# Patient Record
Sex: MALE | Race: WHITE | NOT HISPANIC OR LATINO | URBAN - METROPOLITAN AREA
[De-identification: names, ages, dates, MRNs, and addresses within clinical notes are randomized per-mention and may not be internally consistent; named-entity substitution may affect disease eponyms.]

---

## 2024-03-25 ENCOUNTER — OFFICE VISIT (OUTPATIENT)
Dept: SPORTS MEDICINE | Facility: CLINIC | Age: 49
End: 2024-03-25
Payer: COMMERCIAL

## 2024-03-25 ENCOUNTER — HOSPITAL ENCOUNTER (OUTPATIENT)
Dept: RADIOLOGY | Facility: CLINIC | Age: 49
Discharge: HOME | End: 2024-03-25
Payer: COMMERCIAL

## 2024-03-25 VITALS
BODY MASS INDEX: 25.77 KG/M2 | HEART RATE: 80 BPM | HEIGHT: 70 IN | WEIGHT: 180 LBS | DIASTOLIC BLOOD PRESSURE: 80 MMHG | SYSTOLIC BLOOD PRESSURE: 114 MMHG

## 2024-03-25 DIAGNOSIS — M25.561 ACUTE PAIN OF RIGHT KNEE: ICD-10-CM

## 2024-03-25 DIAGNOSIS — Q66.6 VALGUS DEFORMITY OF BOTH FEET: ICD-10-CM

## 2024-03-25 DIAGNOSIS — M70.51 SEMIMEMBRANOSUS BURSITIS OF RIGHT KNEE: ICD-10-CM

## 2024-03-25 DIAGNOSIS — M17.11 PRIMARY OSTEOARTHRITIS OF RIGHT KNEE: ICD-10-CM

## 2024-03-25 DIAGNOSIS — S83.511A SPRAIN OF ANTERIOR CRUCIATE LIGAMENT OF RIGHT KNEE, INITIAL ENCOUNTER: ICD-10-CM

## 2024-03-25 DIAGNOSIS — M24.559 HIP FLEXOR TIGHTNESS, UNSPECIFIED LATERALITY: ICD-10-CM

## 2024-03-25 DIAGNOSIS — S86.111A GASTROCNEMIUS STRAIN, RIGHT, INITIAL ENCOUNTER: ICD-10-CM

## 2024-03-25 DIAGNOSIS — M67.863 TENDINOSIS OF RIGHT KNEE: ICD-10-CM

## 2024-03-25 DIAGNOSIS — M22.8X1 PATELLAR TRACKING DISORDER OF RIGHT KNEE: ICD-10-CM

## 2024-03-25 DIAGNOSIS — M62.9 HAMSTRING TIGHTNESS OF BOTH LOWER EXTREMITIES: ICD-10-CM

## 2024-03-25 DIAGNOSIS — S83.241A TEAR OF MEDIAL MENISCUS OF RIGHT KNEE, UNSPECIFIED TEAR TYPE, UNSPECIFIED WHETHER OLD OR CURRENT TEAR, INITIAL ENCOUNTER: ICD-10-CM

## 2024-03-25 DIAGNOSIS — M17.11 PRIMARY LOCALIZED OSTEOARTHRITIS OF RIGHT KNEE: ICD-10-CM

## 2024-03-25 DIAGNOSIS — M25.361 KNEE INSTABILITY, RIGHT: ICD-10-CM

## 2024-03-25 DIAGNOSIS — M25.561 ACUTE PAIN OF RIGHT KNEE: Primary | ICD-10-CM

## 2024-03-25 DIAGNOSIS — S86.109A TRAUMATIC INJURY OF POPLITEUS MUSCLE: ICD-10-CM

## 2024-03-25 DIAGNOSIS — M21.70 LEG LENGTH DISCREPANCY: ICD-10-CM

## 2024-03-25 DIAGNOSIS — M25.461 EFFUSION OF RIGHT KNEE: ICD-10-CM

## 2024-03-25 PROCEDURE — 1036F TOBACCO NON-USER: CPT | Performed by: FAMILY MEDICINE

## 2024-03-25 PROCEDURE — 73564 X-RAY EXAM KNEE 4 OR MORE: CPT | Mod: RT

## 2024-03-25 PROCEDURE — 97170 ATHLETIC TRN EVAL MOD CMPLX: CPT | Performed by: FAMILY MEDICINE

## 2024-03-25 PROCEDURE — 73564 X-RAY EXAM KNEE 4 OR MORE: CPT | Mod: RIGHT SIDE | Performed by: RADIOLOGY

## 2024-03-25 PROCEDURE — 99204 OFFICE O/P NEW MOD 45 MIN: CPT | Performed by: FAMILY MEDICINE

## 2024-03-25 PROCEDURE — 99214 OFFICE O/P EST MOD 30 MIN: CPT | Performed by: FAMILY MEDICINE

## 2024-03-25 RX ORDER — IBUPROFEN 800 MG/1
800 TABLET ORAL 3 TIMES DAILY
Qty: 90 TABLET | Refills: 0 | Status: SHIPPED | OUTPATIENT
Start: 2024-03-25 | End: 2024-04-24

## 2024-03-25 RX ORDER — SERTRALINE HYDROCHLORIDE 100 MG/1
300 TABLET, FILM COATED ORAL
COMMUNITY

## 2024-03-25 ASSESSMENT — PAIN DESCRIPTION - DESCRIPTORS: DESCRIPTORS: ACHING;SHARP;STABBING;THROBBING

## 2024-03-25 ASSESSMENT — PAIN SCALES - GENERAL
PAINLEVEL_OUTOF10: 3
PAINLEVEL: 3

## 2024-03-25 ASSESSMENT — LIFESTYLE VARIABLES
HOW OFTEN DURING THE LAST YEAR HAVE YOU BEEN UNABLE TO REMEMBER WHAT HAPPENED THE NIGHT BEFORE BECAUSE YOU HAD BEEN DRINKING: NEVER
HOW OFTEN DO YOU HAVE A DRINK CONTAINING ALCOHOL: NEVER
HAVE YOU OR SOMEONE ELSE BEEN INJURED AS A RESULT OF YOUR DRINKING: NO
HOW OFTEN DURING THE LAST YEAR HAVE YOU FOUND THAT YOU WERE NOT ABLE TO STOP DRINKING ONCE YOU HAD STARTED: NEVER
AUDIT-C TOTAL SCORE: 0
HOW MANY STANDARD DRINKS CONTAINING ALCOHOL DO YOU HAVE ON A TYPICAL DAY: PATIENT DOES NOT DRINK
HAS A RELATIVE, FRIEND, DOCTOR, OR ANOTHER HEALTH PROFESSIONAL EXPRESSED CONCERN ABOUT YOUR DRINKING OR SUGGESTED YOU CUT DOWN: NO
HOW OFTEN DURING THE LAST YEAR HAVE YOU HAD A FEELING OF GUILT OR REMORSE AFTER DRINKING: NEVER
HOW OFTEN DURING THE LAST YEAR HAVE YOU NEEDED AN ALCOHOLIC DRINK FIRST THING IN THE MORNING TO GET YOURSELF GOING AFTER A NIGHT OF HEAVY DRINKING: NEVER
HOW OFTEN DO YOU HAVE SIX OR MORE DRINKS ON ONE OCCASION: NEVER
SKIP TO QUESTIONS 9-10: 1
AUDIT TOTAL SCORE: 0
HOW OFTEN DURING THE LAST YEAR HAVE YOU FAILED TO DO WHAT WAS NORMALLY EXPECTED FROM YOU BECAUSE OF DRINKING: NEVER

## 2024-03-25 ASSESSMENT — PATIENT HEALTH QUESTIONNAIRE - PHQ9
2. FEELING DOWN, DEPRESSED OR HOPELESS: NOT AT ALL
SUM OF ALL RESPONSES TO PHQ9 QUESTIONS 1 AND 2: 0
1. LITTLE INTEREST OR PLEASURE IN DOING THINGS: NOT AT ALL

## 2024-03-25 ASSESSMENT — COLUMBIA-SUICIDE SEVERITY RATING SCALE - C-SSRS
1. IN THE PAST MONTH, HAVE YOU WISHED YOU WERE DEAD OR WISHED YOU COULD GO TO SLEEP AND NOT WAKE UP?: NO
2. HAVE YOU ACTUALLY HAD ANY THOUGHTS OF KILLING YOURSELF?: NO

## 2024-03-25 ASSESSMENT — ENCOUNTER SYMPTOMS
OCCASIONAL FEELINGS OF UNSTEADINESS: 0
DEPRESSION: 0
LOSS OF SENSATION IN FEET: 0

## 2024-03-25 ASSESSMENT — PAIN - FUNCTIONAL ASSESSMENT: PAIN_FUNCTIONAL_ASSESSMENT: 0-10

## 2024-03-25 NOTE — PROGRESS NOTES
Verbal consent of the patient and/or verbal parental consent for patients under the age of 18 have been obtained to conduct a physical examination at this office visit.    New patient  History Of Present Illness  03/25/24 Pipe Izaguirre is a 48 y.o. male who presents for an evaluation of their Right knee pain .  He states that he has had right knee pain x 1 month with no known injury; however, his pain got significantly worse after feeling a pop on Friday (3/22/24) while walking into work. He states that his right knee was extended and he felt the pop while walking. He did not slip or step in a pothole, etc. He denies any bruising proceeding the injury.   He has been using crutches for ambulation since last Friday due to pain and swelling. Positive c/o instability. He denies any locking/catching, numbness/tingling.   He states that he had previously injured his RIGHT knee approximately 15-16 years and was diagnosed with a small medial meniscus tear at that time. He treated the meniscus tear conservatively and did not have any issues since that time. As a teenager he consistently sprained his ankle. He has noticed throughout the years he has put more pressure on his RIGHT foot and walking on the balls of his feet. This is due to compensation.   He states that his right knee pain has improved slightly since Friday night, but he continues to have moderate swelling, instability, and pain with ambulation.   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   He is taking OTC Ibuprofen and Tylenol for pain management and has been applying ice to his right knee frequently.   Pain today is 3/10 and is achy, sharp, stabbing, throbbing in nature. His pain is exacerbated by walking, bending, stair climbing.     All previous Progress Notes and imaging results related to this patients chief complaint have been reviewed in preparation for this examination.    Past Medical  History  He has a past medical history of Depression.    Surgical History  He has no past surgical history on file.     Social History  He reports that he has never smoked. He has never been exposed to tobacco smoke. He has never used smokeless tobacco. He reports that he does not currently use alcohol. Drug use questions deferred to the physician.    Family History  No family history on file.     Allergies  Patient has no known allergies.    Review of Systems  CONSTITUTIONAL:   Negative for weight change, loss of appetite, fatigue, weakness, fever, chills, night sweats, headaches .           HEENT:   Negative for cold, cough, sore throat, sinus pain, swollen lymph nodes.           OPHTHALMOLOGY:   Negative for diminished vision, blurred vision, loss of vision, double vision.           ALLERGY:   Negative for runny nose, scratchy throat, sinus congestion, rash, facial pressure, nasal congestion, post-nasal drip.           CARDIOLOGY:   Negative for chest pain, palpitations, murmurs, irregular heart beat, shortness of breath, leg edema, dyspnea on exertion, fatigue, dizziness.           RESPIRATORY:   Negative for chest pain, shortness of breath, swelling of the legs, asthma/copd, chest congestion, pain with breathing .           GASTROENTEROLOGY:   Negative for nausea, vomitting, heartburn, constipation, diarrhea, blood in stool, change in bowel habits, black stool.           HEMATOLOGY/LYMPH:   Negative for fatigue, loss of appetitie, easy bruising, easy bleeding, anemia, abnormal bleeding, slow healing.           ENDOCRINOLOGY:   Negative for polyuria, polydipsia, polyphagia, fatigue, weight loss, weight gain, cold intolerance, heat intolerance, diabetes.           MUSCULOSKELETAL:   Positive  for Right Knee pain and instability         DERMATOLOGY:   Negative for rash, bruising.           NEUROLOGY:   Negative for tingling, numbness, gait abnormality, paresthesias, weakness, sciatica.        General  Examination:  GENERAL APPEARANCE: Appears well, pleasant, and cooperative, NAD.   HEENT: Normal, unremarkable.   NECK: Supple.   HEART: RRR, normal S1S2.   LUNGS: Clear to auscultation bilaterally.   ABDOMEN: Soft, NT/ND, BS present.   EXTREMITIES: No cyanosis, clubbing.   SKIN: No rash or cellulitis.   NEUROLOGIC EXAM: Awake, A&O x 3, CN's II-XII grossly intact.   PSYCH: Good eye contact, appropriate mood and affect     The Scribe, Mariana, was present in the room during the entire visit including, but not limited to the physical examination!.    Examination:  RIGHT Posterior Knee  Edema: Positive  Diffusely.   Effusion: Negative.   Percussion Test: Negative  Tuning Fork Test:Negative  Ecchymosis/Bruising: Negative.            Palpation:   Positive  tenderness to palpation: RIGHT Knee posterior medial meniscus, medial head of gastrocnemius, popliteus, body of the medial mensicus and posterior horn of medial meniscus, distal hamstring, semimembrinosis and tendinosis    Orientation: Asymmetrical: because of the swelling.     Range of Motion:    Positive Knee Flexion ( 0-135 degrees) FROM, pain with end range flexion   Positive Knee Extension ( 0-15 degrees) Decreased because of pain and swelling           Muscle Strength:    Positive +4/+5 Quadricep Extension Causes pain  Positive +4/+5 Hamstring Flexion Causes pain  +5+5 Gastrocnemius  +5+5 Soleus.            Proprioception:   Pain with Squat: Positive    Pain with Sumo Squat:  Positive   Hop Test: Not assessed today  Single leg balance test Positive            Vascular:   +2/+4 Femoral  +2/+4 Dorsalis Pedis  +2/+4 Posterior Tibial  Capillary Refill less than 2 seconds.                Knee - ACL:  Anterior Drawer Test: Negative  Lachman Test: Negative  Lelli Test: Negative          KNEE - MCL / LCL:  Valgus Stress Test: 90 degrees: Negative  20-30 degrees: Negative  Varus Stress Test:  90 degrees: Negative, 20-30 degrees: Negative.   Apley Distraction Test: Positive   Medial.   Thessaly Test: Positive  Origin and Insertion MCL,  Posterior Medial Meniscus, Distal Hamstring.            KNEE - MENISCUS:  Apley Compression Test: Positive   Stienmann Test:Positive   Maren Test:  Positive Medial joint line.         Bounce Home Test:  Positive   Thessaly Test:  Positive Origin and Insertion MCL, Anterior Medial Meniscus, Posterior Medial Meniscus, Distal Hamstring.             KNEE - PATELLA:  Apprehension Test:  Positive   Glide Test:  Positive   Grind Test: Positive   Patella Tracking Test: Positive              KNEE - QUADRICEPS:         VMO Test: Positive   VLO Test:  Negative    Hip/Pelvis - Sacrum:  Leg Length Supine: Positive RIGHT leg shorter than the Left, during Physical Examination, and Will verify in the future when patient is able to fully bear weight   Leg Length Supine to Seated (Derbolowsky Sign): Positive RIGHT leg shorter than the Left, during Physical Examination, and Will verify in the future when patient is able to fully bear weight   Standing Flexion Test: Negative  Seated Flexion Test: Negative   Spring Test: Negative   Sacral Somatic Dysfunction: Negative   Hip Flexor Tightness: Positive RIGHT > Left  Hamstring Tightness: Positive Left> RIGHT     Examination:  RIGHTLower Extremity Gastrocnemius   Erythema: Negative.   Edema: Negative.   Effusion: Negative.   Warmth: Negative.   Ecchymosis/Bruising: Negative.   Percussion Test: Negative.   Tuning Fork Test: Negative.   Abrasions: Negative.   Orientation: Symmetrical.     ROM: FROM.            Muscle Strength:   +5/+5 Planar Flexion  +5/+5 Dorsi Flexion          +5/+5 Inversion  +5/+5 Eversion  +5/+5 Plantarflexion in combination with inversion  +5/+5 Plantarflexion in combination with eversion  +5/+5 Dorsiflexion in combination with inversion (Posterior Tibialis)  +5/+5 Dorsiflexion in combination with eversion (Peroneal Brevis)  +5/+5 Dorsiflexion in combination with eversion and flexion of great toe (Peroneal  Longus).            DTR/Neurological:   Sensation Intact, 2 Point Discrimination Test: Negative  +2/+4 Achilles Tendon Reflex (S1).     Sensation/Neurological:   Negative, Sensation Intact, 2 Point Discrimination Test: Negative  L3: Low back and front of upper leg/thigh  L4: Low back, front of upper leg/thigh, front of lower leg/calf, front of medial area of knee, and inside of ankle  L5: Low back, front and lateral knee, front and outside of lower leg/calf, top and bottom of foot, and first four toes especially big toe  S1: low back, back of upper leg/thigh, back and inside of lower leg, calf and little toe  S2: Buttocks, genitals, back of upper leg/thigh, and calves  S3: Buttocks and genitals.            Palpation: Positive: Tenderness to Palpation RIGHT lateral and medial gastrocnemius proximally, Popliteus           Vascular:   +2/+4 Dorsalis Pedis  +2/+4 Posterior Tibialis  Capillary Refill < 2 Seconds.               Leg/Ankle/Foot - Ankle Impingement:  Posterior Ankle Impingement Test: Negative.   Anterior Ankle Impingement Test: Negative.         Leg/Ankle/Foot - Ankle Instability:  Flexibility Test: Negative.   Anterior Drawer Test: Positive  slightly but end point felt   Talar Tilt Test: Negative.   External Rotation Kleiger Plantar Flexion Test: Negative.   External Rotation Kleiger Dorsiflexion Test: Negative.   Squeeze Test: Negative.   Dorsiflexion Test: Negative.   Posterior Tibial or Peroneal Tendon Instability Test: Negative.   Horizontal Squeeze Test: Negative.   Vertical Squeeze Test: Negative.         Leg/Ankle/Foot - DVT:  Saumya's Sign: Negative.         Leg/Ankle/Foot - Forefoot:  Strunsky Test:  Negative.   Gaenslen Maneuver Test:  Negative.   Metatarsal Tap Test:  Negative.   Crepitation Test:  Negative.         Leg/Ankle/Foot - Hindfoot Achilles/Calcaneus:  Butler Compression Test: Negative.   Hoffa Sign: Negative.   Achilles Tendon Tap Test: Negative.   Heel Compression Test: Negative.          Leg/Ankle/Foot - Nerve Irritation:  Haider Sign: Negative.   Digital Nerve Stretch Test: Negative.   Tinel Sign: Negative.   Tourniquet Sign: Negative.         Feet/Foot: Positive BILATERAL Valgus foot        Imaging and Diagnostics Review:    MRI RIGHT knee ordered today.   ---------------------------------------------   my personal over read shows tricompartmental DJD especially medial joint line and patellofemoral  XR RIGHT knee ordered today.   -------------------------------------------  Assessment   1. Acute pain of right knee  XR knee right 4+ views    Referral to Physical Therapy    MR knee right wo IV contrast    ibuprofen 800 mg tablet      2. Effusion of right knee  Referral to Physical Therapy    MR knee right wo IV contrast    ibuprofen 800 mg tablet      3. Knee instability, right  Referral to Physical Therapy    MR knee right wo IV contrast    ibuprofen 800 mg tablet      4. Primary localized osteoarthritis of right knee  Referral to Physical Therapy    MR knee right wo IV contrast    ibuprofen 800 mg tablet      5. Tear of medial meniscus of right knee, unspecified tear type, unspecified whether old or current tear, initial encounter  Referral to Physical Therapy    MR knee right wo IV contrast    ibuprofen 800 mg tablet      6. Gastrocnemius strain, right, initial encounter  Referral to Physical Therapy    MR knee right wo IV contrast    ibuprofen 800 mg tablet      7. Traumatic injury of popliteus muscle  Referral to Physical Therapy    MR knee right wo IV contrast    ibuprofen 800 mg tablet      8. Sprain of anterior cruciate ligament of right knee, initial encounter  Referral to Physical Therapy    MR knee right wo IV contrast    ibuprofen 800 mg tablet      9. Hamstring tightness of both lower extremities  Referral to Physical Therapy    MR knee right wo IV contrast    ibuprofen 800 mg tablet      10. Hip flexor tightness, unspecified laterality  Referral to Physical Therapy    MR knee right  wo IV contrast    ibuprofen 800 mg tablet      11. Tendinosis of right knee  Referral to Physical Therapy    MR knee right wo IV contrast    ibuprofen 800 mg tablet      12. Semimembranosus bursitis of right knee  Referral to Physical Therapy    MR knee right wo IV contrast    ibuprofen 800 mg tablet      13. Patellar tracking disorder of right knee  Referral to Physical Therapy    MR knee right wo IV contrast    ibuprofen 800 mg tablet      14. Leg length discrepancy  Referral to Physical Therapy    MR knee right wo IV contrast    ibuprofen 800 mg tablet      15. Valgus deformity of both feet  Referral to Physical Therapy    MR knee right wo IV contrast    ibuprofen 800 mg tablet      16. Primary osteoarthritis of right knee            Treatment or Intervention:  May continue to alternate ice and moist heat as needed  Reviewed handout degenerative joint disease of the knees, patellofemoral tracking syndrome, and  meniscus injury in detail with the patient at the time of this office visit.    Start into Physical Therapy 1-2 times a week for 8-10 weeks with manual therapy as well as dry needling and IASTM    Reviewed home exercises to be performed by the patient routinely   Stressed the importance of wearing shoes with good stability control to help with the biomechanics affecting the knees and lower extremities    Stressed the importance of wearing full foot insoles to help with the biomechanics affecting the knees and lower extremities    After MRI recommended to start over-the-counter calcium with vitamin-D 2 -3000+ milligrams a day, as well as OTC symphytum as directed daily to promote bony healing, in addition to a daily multivitamin.    After MRI recommended to start over-the-counter curcumin, turmeric, boswellia, as well as egg shell membrane as directed to aid with joint inflammation.   After MRI recommended to start over-the-counter Move Free for joint health.    The following prescription was sent to the  patient's pharmacy of record: Ibuprofen 800 milligrams, taking one every 8 hours with food as needed, Duration: 30 days, Dispense: #60, Refill: 1; the patient may alternate with OTC Tylenol Extra Strength or OTC Tylenol Arthritis, taking one every 6-8 hours with food as needed.    Patient advised regarding the risks and/or potential adverse reactions and/or side effects of any prescribed medications along with any over-the-counter medications or any supplements used. Patient advised to seek immediate medical care if any adverse reactions occur. The patient and/or patient(s) parent(s) verbalized their understanding   MRI of the RIGHT  knee to rule out ligament or tendon injury versus meniscal injury versus stress fracture versus other    Discussed in detail with the patient to the level of their understanding the possibility in the future of regenerative injections versus corticosteroid injections versus viscosupplementation injections versus a combination    When patient is able to equally weight bear, will order standing erect pelvis XR and  will provide appropriate __ millimeter heel lift to be placed in the RIGHT/LEFT shoe to accommodate for leg length discrepancy found on standing erect pelvis xray   Recommended to wear supportive shoes with insoles at work instead of wearing dress shoes.   Patient needs Play Maker Knee brace for the right knee. Staff will reach out to bracing representative, Forrest Egan, as correct size brace is not in stock in the office. Patient was given a  playmaker brace for their  right knee injury/condition. The patient is ambulatory with or without aid and feels more stable with the brace on. The brace definitely helps improve their function as well as stability. Verbal and written instructions for the use, wear schedule, cleaning, and application of this brace were given. Patient was instructed that should the brace result in increased pain, decreased sensation, numbness and/or  tingling, increased swelling, or an overall worsening of their medical condition; to please contact our office immediately. Orthotic/brace management and training was provided for skin care, modifications due to healing tissues, edema changes, interruption in skin integrity, and safety precautions with the Orthosis/brace.    Patient was educated on NWB vs PWB with crutches at this visit. He is using a pair of crutches that he had with him, and staff evaluated crutches for proper fit at this visit.   Follow-up after RIGHT  knee MRI or in 2-4 weeks for a reevaluation and possible xray or sooner as needed    Please note that this report has been produced using speech recognition software.  It may contain errors related to grammar, punctuation or spelling.  Electronically signed, but not reviewed.  FRED Park, Director of Sports Medicine    HARJINDER GONZALZE on 3/25/24 at 1:55 PM.     GASTON Park DO

## 2024-03-25 NOTE — PATIENT INSTRUCTIONS
May continue to alternate ice and moist heat as needed  Reviewed handout degenerative joint disease of the knees, patellofemoral tracking syndrome, and/or patellar tendinitis in detail with the patient to the level of their understanding; a copy of this handout was provided to the patient at the time of this office visit.    Start into Physical Therapy 1-2 times a week for 8-10 weeks with manual therapy as well as dry needling and IASTM    Reviewed home exercises to be performed by the patient routinely   Stressed the importance of wearing shoes with good stability control to help with the biomechanics affecting the knees and lower extremities    Stressed the importance of wearing full foot insoles to help with the biomechanics affecting the knees and lower extremities    After MRI recommended to start over-the-counter calcium with vitamin-D 2 -3000+ milligrams a day, as well as OTC symphytum as directed daily to promote bony healing, in addition to a daily multivitamin.    After MRI recommended to start over-the-counter curcumin, turmeric, boswellia, as well as egg shell membrane as directed to aid with joint inflammation.   After MRI recommended to start over-the-counter Move Free for joint health.    The following prescription was sent to the patient's pharmacy of record: Ibuprofen 800 milligrams, taking one every 8 hours with food as needed, Duration: 30 days, Dispense: #60, Refill: 1; the patient may alternate with OTC Tylenol Extra Strength or OTC Tylenol Arthritis, taking one every 6-8 hours with food as needed.    Patient advised regarding the risks and/or potential adverse reactions and/or side effects of any prescribed medications along with any over-the-counter medications or any supplements used. Patient advised to seek immediate medical care if any adverse reactions occur. The patient and/or patient(s) parent(s) verbalized their understanding   MRI of the RIGHT  knee to rule out ligament or tendon  injury versus meniscal injury versus stress fracture versus other    Discussed in detail with the patient to the level of their understanding the possibility in the future of regenerative injections versus corticosteroid injections versus viscosupplementation injections versus a combination    When patient is able to equally weight bear, will order standing erect pelvis XR and  will provide appropriate __ millimeter heel lift to be placed in the RIGHT/LEFT shoe to accommodate for leg length discrepancy found on standing erect pelvis xray   Recommended to wear supportive shoes with insoles at work instead of wearing dress shoes.   Patient needs Play Maker Knee brace for the right knee. Staff will reach out to bracing representative, Forrest Egan, as correct size brace is not in stock in the office. Patient was educated on NWB vs PWB with crutches at this visit. He is using a pair of crutches that he had with him, and staff evaluated crutches for proper fit at this visit.   Follow-up after RIGHT  knee MRI or in 2-4 weeks for a reevaluation and possible xray or sooner as needed

## 2024-04-04 NOTE — PROGRESS NOTES
Verbal consent of the patient and/or verbal parental consent for patients under the age of 18 have been obtained to conduct a physical examination at this office visit.    Established patient  History Of Present Illness  04/11/24 Pipe Izaguirre is a 48 y.o. male who presents for reevaluation of his RIGHT knee and to review his MRI results. He is accompanied to this visit by his wife. He states that while he continues to have pain in his RIGHT knee, it has improved overall since his last visit. However, he does feel like he has reached a bit of a plateau.  however he has not had a chance to start into physical therapy yet because of scheduling issues with physical therapy being too backed up.  He was able to wean himself off of his crutches after 3-4 days, and continues to take Ibuprofen 800 mg for pain management  and is using ice as needed.   He denies any instability, locking or catching in his RIGHT knee. He will get increased swelling at the end of the work day.   He is scheduled to start physical therapy next week.   He rates pain at 4/10 and states that the majority of his RIGHT Knee pain is now anterior and is primarily achy in nature. He has increased pain with prolonged walking, climbing stairs, bending, and squatting.     He says additionally he is having pain going up and down steps especially coming down and also after he sitting for long periods of time and goes to stand up having pain as well.  He would like to try to avoid surgical intervention is much as possible.  We talked in detail about his MRI results and his treatment options going forward.    All previous Progress Notes and imaging results related to this patients chief complaint have been reviewed in preparation for this examination.    Past Medical History  He has a past medical history of Depression.    Surgical History  He has no past surgical history on file.     Social History  He reports that he has never smoked. He has never been  exposed to tobacco smoke. He has never used smokeless tobacco. He reports that he does not currently use alcohol. Drug use questions deferred to the physician.    Family History  No family history on file.     Allergies  Patient has no known allergies.    Historical Clinical Intake  03/25/24 Pipe Izaguirre is a 48 y.o. male who presents for an evaluation of their Right knee pain .  He states that he has had right knee pain x 1 month with no known injury; however, his pain got significantly worse after feeling a pop on Friday (3/22/24) while walking into work. He states that his right knee was extended and he felt the pop while walking. He did not slip or step in a pothole, etc. He denies any bruising proceeding the injury.   He has been using crutches for ambulation since last Friday due to pain and swelling. Positive c/o instability. He denies any locking/catching, numbness/tingling.   He states that he had previously injured his RIGHT knee approximately 15-16 years and was diagnosed with a small medial meniscus tear at that time. He treated the meniscus tear conservatively and did not have any issues since that time. As a teenager he consistently sprained his ankle. He has noticed throughout the years he has put more pressure on his RIGHT foot and walking on the balls of his feet. This is due to compensation.   He states that his right knee pain has improved slightly since Friday night, but he continues to have moderate swelling, instability, and pain with ambulation.   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   He is taking OTC Ibuprofen and Tylenol for pain management and has been applying ice to his right knee frequently.   Pain today is 3/10 and is achy, sharp, stabbing, throbbing in nature. His pain is exacerbated by walking, bending, stair climbing.     Review of Systems  CONSTITUTIONAL:   Negative for weight change, loss of appetite, fatigue,  weakness, fever, chills, night sweats, headaches .           HEENT:   Negative for cold, cough, sore throat, sinus pain, swollen lymph nodes.           OPHTHALMOLOGY:   Negative for diminished vision, blurred vision, loss of vision, double vision.           ALLERGY:   Negative for runny nose, scratchy throat, sinus congestion, rash, facial pressure, nasal congestion, post-nasal drip.           CARDIOLOGY:   Negative for chest pain, palpitations, murmurs, irregular heart beat, shortness of breath, leg edema, dyspnea on exertion, fatigue, dizziness.           RESPIRATORY:   Negative for chest pain, shortness of breath, swelling of the legs, asthma/copd, chest congestion, pain with breathing .           GASTROENTEROLOGY:   Negative for nausea, vomitting, heartburn, constipation, diarrhea, blood in stool, change in bowel habits, black stool.           HEMATOLOGY/LYMPH:   Negative for fatigue, loss of appetitie, easy bruising, easy bleeding, anemia, abnormal bleeding, slow healing.           ENDOCRINOLOGY:   Negative for polyuria, polydipsia, polyphagia, fatigue, weight loss, weight gain, cold intolerance, heat intolerance, diabetes.           MUSCULOSKELETAL:   Positive  for Right Knee pain and instability         DERMATOLOGY:   Negative for rash, bruising.           NEUROLOGY:   Negative for tingling, numbness, gait abnormality, paresthesias, weakness, sciatica.         General Examination:  GENERAL APPEARANCE: Appears well, pleasant, and cooperative, NAD.   HEENT: Normal, unremarkable.   NECK: Supple.   HEART: RRR, normal S1S2.   LUNGS: Clear to auscultation bilaterally.   ABDOMEN: Soft, NT/ND, BS present.   EXTREMITIES: No cyanosis, clubbing.   SKIN: No rash or cellulitis.   NEUROLOGIC EXAM: Awake, A&O x 3, CN's II-XII grossly intact.   PSYCH: Good eye contact, appropriate mood and affect      The , Brittni, was present in the room during the entire visit including, but not limited to the physical  examination!.     Examination: All findings slightly improved since last visit   RIGHT Posterior Knee  Edema: Positive  Diffusely.   Effusion: Positive    Especially suprapatellar.   Percussion Test: Negative  Tuning Fork Test:Negative  Ecchymosis/Bruising: Negative.            Palpation: All findings slightly improved since last visit   Positive   still some tenderness to palpation: RIGHT Knee body of the lateral meniscus<body and posterior horn of the medial meniscus; tender posteriorly over the region of Baker's Cyst     Orientation: Positive  Still Asymmetrical: because of the  minimal swelling. All findings slightly improved since last visit      Range of Motion:  All findings slightly improved since last visit   Positive Knee Flexion ( 0-135 degrees) Decreased RIGHT approximately 10 degree extension lag  Negative: Knee Extension ( 0-15 degrees)               Muscle Strength:  All findings slightly improved since last visit   Positive +4-+5/+5 Quadricep Extension Causes pain  Positive +4-+5/+5 Hamstring Flexion Causes pain  +5+5 Gastrocnemius  +5+5 Soleus.            Proprioception: All findings slightly improved since last visit   Pain with Squat: Positive    Pain with Sumo Squat:  Positive   Hop Test: Not assessed today  Single leg balance test Positive            Vascular:   +2/+4 Femoral  +2/+4 Dorsalis Pedis  +2/+4 Posterior Tibial  Capillary Refill less than 2 seconds.                Knee - ACL:  Anterior Drawer Test: Negative  Lachman Test: Negative  Lelli Test: Negative          KNEE - MCL / LCL:All findings slightly improved since last visit   Valgus Stress Test: 90 degrees: Negative  20-30 degrees: Negative  Varus Stress Test:  90 degrees: Negative, 20-30 degrees: Negative.   Apley Distraction Test: Negative    Thessaly Test: Positive  Origin and Insertion MCL,  Posterior Medial Meniscus, Distal Hamstring.            KNEE - MENISCUS:All findings slightly improved since last visit   Apley Compression  Test: Negative  Stienmann Test: Negative   Maren Test: Negative        Bounce Home Test: Negative  Thessaly Test:  Positive Origin and Insertion MCL, Anterior Medial Meniscus, Posterior Medial Meniscus, Distal Hamstring.             KNEE - PATELLA:All findings slightly improved since last visit   Apprehension Test:  Negative  Glide Test:  Positive   Grind Test: Positive   Patella Tracking Test: Positive              KNEE - QUADRICEPS:       All findings slightly improved since last visit   VMO Test: Positive   VLO Test:  Negative     Hip/Pelvis - Sacrum:  Leg Length Supine: Positive RIGHT leg shorter than the Left, during Physical Examination, and Will verify in the future when patient is able to fully bear weight   Leg Length Supine to Seated (Derbolowsky Sign): Positive RIGHT leg shorter than the Left, during Physical Examination, and Will verify in the future when patient is able to fully bear weight   Standing Flexion Test: Negative  Seated Flexion Test: Negative   Spring Test: Negative   Sacral Somatic Dysfunction: Negative   Hip Flexor Tightness: Positive RIGHT > Left  Hamstring Tightness: Positive Left> RIGHT      Examination:  RIGHT Lower Extremity Gastrocnemius   Erythema: Negative.   Edema: Negative.   Effusion: Negative.   Warmth: Negative.   Ecchymosis/Bruising: Negative.   Percussion Test: Negative.   Tuning Fork Test: Negative.   Abrasions: Negative.   Orientation: Symmetrical.      ROM: FROM             Muscle Strength:   +5/+5 Planar Flexion  +5/+5 Dorsi Flexion          +5/+5 Inversion  +5/+5 Eversion  +5/+5 Plantarflexion in combination with inversion  +5/+5 Plantarflexion in combination with eversion  +5/+5 Dorsiflexion in combination with inversion (Posterior Tibialis)  +5/+5 Dorsiflexion in combination with eversion (Peroneal Brevis)  +5/+5 Dorsiflexion in combination with eversion and flexion of great toe (Peroneal Longus).            DTR/Neurological:   Sensation Intact, 2 Point  Discrimination Test: Negative  +2/+4 Achilles Tendon Reflex (S1).      Sensation/Neurological:   Negative, Sensation Intact, 2 Point Discrimination Test: Negative  L3: Low back and front of upper leg/thigh  L4: Low back, front of upper leg/thigh, front of lower leg/calf, front of medial area of knee, and inside of ankle  L5: Low back, front and lateral knee, front and outside of lower leg/calf, top and bottom of foot, and first four toes especially big toe  S1: low back, back of upper leg/thigh, back and inside of lower leg, calf and little toe  S2: Buttocks, genitals, back of upper leg/thigh, and calves  S3: Buttocks and genitals.            Palpation: All findings slightly improved since last visit   Positive: Tenderness to Palpation RIGHT lateral and medial gastrocnemius proximally, Popliteus           Vascular:   +2/+4 Dorsalis Pedis  +2/+4 Posterior Tibialis  Capillary Refill < 2 Seconds.               Leg/Ankle/Foot - Ankle Impingement:  Posterior Ankle Impingement Test: Negative.   Anterior Ankle Impingement Test: Negative.          Leg/Ankle/Foot - Ankle Instability:  Flexibility Test: Negative.   Anterior Drawer Test: Negative  Talar Tilt Test: Negative.   External Rotation Kleiger Plantar Flexion Test: Negative.   External Rotation Kleiger Dorsiflexion Test: Negative.   Squeeze Test: Negative.   Dorsiflexion Test: Negative.   Posterior Tibial or Peroneal Tendon Instability Test: Negative.   Horizontal Squeeze Test: Negative.   Vertical Squeeze Test: Negative.          Leg/Ankle/Foot - DVT:  Saumya's Sign: Negative.          Leg/Ankle/Foot - Forefoot:  Strunsky Test:  Negative.   Gaenslen Maneuver Test:  Negative.   Metatarsal Tap Test:  Negative.   Crepitation Test:  Negative.          Leg/Ankle/Foot - Hindfoot Achilles/Calcaneus:  Butler Compression Test: Negative.   Hoffa Sign: Negative.   Achilles Tendon Tap Test: Negative.   Heel Compression Test: Negative.          Leg/Ankle/Foot - Nerve  Irritation:  Haider Sign: Negative.   Digital Nerve Stretch Test: Negative.   Tinel Sign: Negative.   Tourniquet Sign: Negative.          Feet/Foot: Positive BILATERAL Valgus foot         Imaging and Diagnostics Review:    MR KNEE RIGHT WO IV CONTRAST; ;  4/8/2024 9:48 am      INDICATION:  Signs/Symptoms:RIGHT knee pain and instability, right knee effusion,  medial meniscus tear, ACL sprain, proximal gastrocnemius strain,  popliteus strain. Possibility of tendon rupture vs ligamentous tear  vs meniscus tear vs loose body. [facility] radiology to read please..      COMPARISON:  X-ray 03/25/2024      ACCESSION NUMBER(S):  [id number]      ORDERING CLINICIAN:  HARJINDER GONZALEZ      TECHNIQUE:  Multiplanar multisequence MRI obtained of the right knee      FINDINGS:  Anterior cruciate ligament demonstrates mild mucinous degeneration of  the ACL.      Posterior cruciate ligament is unremarkable      Lateral meniscus is unremarkable      Medial meniscus demonstrates at least partial root ligament avulsion  of the posterior horn of the medial meniscus. A portion of the  meniscus appears to remain intact to the root ligament insertion.  However, there is component of peripheral extrusion of the body of  the medial meniscus. No discrete meniscal gap about the root ligament  insertion. Remainder of the posterior horn of the medial meniscus  demonstrates intermediate signal intensity and moderate meniscal  degeneration about the mid posterior horn. However, there is a  extension to the articular surface within the posterior horn about  the body junction along the inferior articular surface with  longitudinal horizontal component of tear involving the posterior  horn about the posterior body and junction.      Medial collateral ligament complex is intact. There is mild edema  about the medial capsular structures      Lateral supporting structures are intact. There is mild popliteus  tendinosis. Mild fluid in the popliteus recess. Mild  edema tracking  along the popliteus muscle distal to the level of the joint line with  mild feathery appearing interstitial muscle edema demonstrated      Extensor mechanism is intact there is mild infrapatellar subcutaneous  edema. Patellofemoral articulation demonstrates severe thinning of  the articular cartilage within portions of the medial facet and apex  of the patella. There is osteophytosis along the outer margin of the  medial facet of the patella. Also, osteophytosis along the outer  margin of the medial trochlea with subchondral reactive edema. There  is severe thinning of the cartilage within the medial trochlea.      Lateral femorotibial compartment demonstrates mild chondromalacia. No  fissuring or cartilage defect. Medial femorotibial compartment  demonstrates severe loss of articular cartilage involving portions of  the central to outer margin weight-bearing portion of the compartment  with osteophytosis along the outer margin of the compartment.      There is a small knee joint effusion. No intra-articular bodies are  demonstrated. There is a small septated popliteal cyst.      IMPRESSION:  1. Least partial root ligament avulsion of the posterior horn of the  medial meniscus without meniscal gap with mild peripheral extrusion  of the body of the medial meniscus    2. Moderate meniscal degeneration of the posterior horn of the medial  meniscus with longitudinal horizontal tear along the inferior  articular surface of the posterior horn about the posterior body and  junction.    3. Medial femorotibial osteoarthritis with grade 4 chondromalacia  involving portions of the central to outer margin of the  weight-bearing portion of the compartment    4. Small knee joint effusion demonstrated    5. Mild popliteus muscle strain with mild edema tracking along the  muscle belly distally.          MACRO:  None      Signed by: Cesar Olson 4/8/2024 10:06 AM  Dictation workstation:    [id number]  -------------------------------------------------   XR KNEE RIGHT 4+ VIEWS; 3/25/2024 8:36 am      INDICATION:  Signs/Symptoms:right knee XR with standing and patella views. RIGHT  knee pain      COMPARISON:  None.      ACCESSION NUMBER(S):  [id number]      ORDERING CLINICIAN:  HARJINDER GONZALEZ      TECHNIQUE:  Number of films: Four view radiographs of the right knee.      FINDINGS:  No fractures or destructive lesions are identified. Degenerative  changes involve the medial and to a lesser degree the patellofemoral  joint compartment, with mild narrowing and small osteophytes. The  alignment is anatomic. The soft tissues are unremarkable. There is no  significant effusion in the suprapatellar bursa.      IMPRESSION:  Mild degenerative change without fracture or subluxation.      Signed by: Amandeep Radford 3/25/2024 2:40 PM  Dictation workstation:   [id number]      ---------------------------------------------   my personal over read shows tricompartmental DJD especially medial joint line and patellofemoral     Assessment   1. Traumatic injury of popliteus muscle        2. Acute pain of right knee        3. Effusion of right knee        4. Edema of knee        5. Knee instability, right        6. Primary localized osteoarthritis of right knee        7. Sprain of medial collateral ligament of right knee, sequela        8. Acute medial meniscus tear of right knee, subsequent encounter        9. Popliteus tendinitis of right lower extremity        10. Popliteal cyst, right        11. Semimembranosus bursitis of right knee        12. Chondromalacia of right patella        13. Patellar tracking disorder of right knee        14. Leg length discrepancy        15. Valgus deformity of both feet        16. Tendinosis of right knee        17. Hip flexor tightness, unspecified laterality        18. Hamstring tightness of both lower extremities        19. Sprain of anterior cruciate ligament of right knee,  subsequent encounter        20. Osteoarthritis of right knee, unspecified osteoarthritis type        21. Gastrocnemius strain, right, initial encounter            Treatment or Intervention:   May continue to alternate ice and moist heat as needed  Reviewed  degenerative joint disease of the knees, patellofemoral tracking syndrome, and  meniscus injury in detail with the patient  and his wife at the time of this office visit.    Stressed the importance of starting Physical Therapy 1-2 times a week for 8-10 weeks with manual therapy as well as dry needling and IASTM    Once again, reviewed home exercises to be performed by the patient routinely   Once again, stressed the importance of wearing shoes with good stability control to help with the biomechanics affecting the knees and lower extremities    Once again, stressed the importance of wearing full foot insoles to help with the biomechanics affecting the knees and lower extremities    Recommend to start over-the-counter  vitamin-D 2 -3000+ milligrams a day, as well as  a daily multivitamin.    Recommended to start over-the-counter curcumin, turmeric, boswellia, as well as egg shell membrane as directed to aid with joint inflammation.   Recommended to start over-the-counter Move Free for joint health.    Continue to take  Ibuprofen 800 milligrams, taking one every 8 hours with food as needed,   The patient may alternate  ibuprofen with OTC Tylenol Extra Strength or OTC Tylenol Arthritis, taking one every 6-8 hours with food as needed.    Patient advised regarding the risks and/or potential adverse reactions and/or side effects of any prescribed medications along with any over-the-counter medications or any supplements used. Patient advised to seek immediate medical care if any adverse reactions occur. The patient and/or patient(s) parent(s) verbalized their understanding   Reviewed MRI of the RIGHT  knee in detail with the patient to the level of their understanding.    Once again, discussed in detail with the patient to the level of their understanding the possibility in the future of regenerative injections versus corticosteroid injections versus viscosupplementation injections versus a combination    When patient is able to equally weight bear, will order standing erect pelvis XR and  will provide appropriate __ millimeter heel lift to be placed in the RIGHT/LEFT shoe to accommodate for leg length discrepancy found on standing erect pelvis xray   Once again, recommended to wear supportive shoes with insoles at work instead of wearing dress shoes.   Staff to initiate prior authorization process for viscosupplementation injections into the patients RIGHT/LEFT/BILATERAL knee under ultrasound. Staff will contact the patient to schedule injections once approval is received. and Patient to decide whether or not they will alternate with regenerative Prolozone injections as they are not covered by insurance; a price quote for these out-of-pocket expenses has been provided to the patient at the time of this office visit.    recommendation red light therapy might over read adapt series  Follow up once viscosupplementation injections are approved for the right knee  and potentially will be alternating with regenerative injections.   Please note that this report has been produced using speech recognition software.  It may contain errors related to grammar, punctuation or spelling.  Electronically signed, but not reviewed.  FRED Park, Director of Sports Medicine      HARJINDER GONZALEZ on 4/11/24 at 9:12 AM.     GASTON Park DO

## 2024-04-08 ENCOUNTER — HOSPITAL ENCOUNTER (OUTPATIENT)
Dept: RADIOLOGY | Facility: HOSPITAL | Age: 49
Discharge: HOME | End: 2024-04-08
Payer: COMMERCIAL

## 2024-04-08 DIAGNOSIS — S86.109A TRAUMATIC INJURY OF POPLITEUS MUSCLE: ICD-10-CM

## 2024-04-08 DIAGNOSIS — M67.863 TENDINOSIS OF RIGHT KNEE: ICD-10-CM

## 2024-04-08 DIAGNOSIS — S83.241A TEAR OF MEDIAL MENISCUS OF RIGHT KNEE, UNSPECIFIED TEAR TYPE, UNSPECIFIED WHETHER OLD OR CURRENT TEAR, INITIAL ENCOUNTER: ICD-10-CM

## 2024-04-08 DIAGNOSIS — M70.51 SEMIMEMBRANOSUS BURSITIS OF RIGHT KNEE: ICD-10-CM

## 2024-04-08 DIAGNOSIS — M25.461 EFFUSION OF RIGHT KNEE: ICD-10-CM

## 2024-04-08 DIAGNOSIS — M25.561 ACUTE PAIN OF RIGHT KNEE: ICD-10-CM

## 2024-04-08 DIAGNOSIS — M17.11 PRIMARY LOCALIZED OSTEOARTHRITIS OF RIGHT KNEE: ICD-10-CM

## 2024-04-08 DIAGNOSIS — M25.361 KNEE INSTABILITY, RIGHT: ICD-10-CM

## 2024-04-08 DIAGNOSIS — M24.559 HIP FLEXOR TIGHTNESS, UNSPECIFIED LATERALITY: ICD-10-CM

## 2024-04-08 DIAGNOSIS — Q66.6 VALGUS DEFORMITY OF BOTH FEET: ICD-10-CM

## 2024-04-08 DIAGNOSIS — M21.70 LEG LENGTH DISCREPANCY: ICD-10-CM

## 2024-04-08 DIAGNOSIS — S86.111A GASTROCNEMIUS STRAIN, RIGHT, INITIAL ENCOUNTER: ICD-10-CM

## 2024-04-08 DIAGNOSIS — M62.9 HAMSTRING TIGHTNESS OF BOTH LOWER EXTREMITIES: ICD-10-CM

## 2024-04-08 DIAGNOSIS — S83.511A SPRAIN OF ANTERIOR CRUCIATE LIGAMENT OF RIGHT KNEE, INITIAL ENCOUNTER: ICD-10-CM

## 2024-04-08 DIAGNOSIS — M22.8X1 PATELLAR TRACKING DISORDER OF RIGHT KNEE: ICD-10-CM

## 2024-04-08 PROCEDURE — 73721 MRI JNT OF LWR EXTRE W/O DYE: CPT | Mod: RT

## 2024-04-08 PROCEDURE — 73721 MRI JNT OF LWR EXTRE W/O DYE: CPT | Mod: RIGHT SIDE | Performed by: RADIOLOGY

## 2024-04-10 ENCOUNTER — TELEPHONE (OUTPATIENT)
Dept: SPORTS MEDICINE | Facility: CLINIC | Age: 49
End: 2024-04-10

## 2024-04-10 ENCOUNTER — OFFICE VISIT (OUTPATIENT)
Dept: SPORTS MEDICINE | Facility: CLINIC | Age: 49
End: 2024-04-10
Payer: COMMERCIAL

## 2024-04-10 VITALS
BODY MASS INDEX: 25.83 KG/M2 | WEIGHT: 180 LBS | HEART RATE: 73 BPM | DIASTOLIC BLOOD PRESSURE: 76 MMHG | SYSTOLIC BLOOD PRESSURE: 116 MMHG

## 2024-04-10 DIAGNOSIS — M25.469 EDEMA OF KNEE: ICD-10-CM

## 2024-04-10 DIAGNOSIS — M71.21 POPLITEAL CYST, RIGHT: ICD-10-CM

## 2024-04-10 DIAGNOSIS — M24.559 HIP FLEXOR TIGHTNESS, UNSPECIFIED LATERALITY: ICD-10-CM

## 2024-04-10 DIAGNOSIS — S83.511D SPRAIN OF ANTERIOR CRUCIATE LIGAMENT OF RIGHT KNEE, SUBSEQUENT ENCOUNTER: ICD-10-CM

## 2024-04-10 DIAGNOSIS — M22.8X1 PATELLAR TRACKING DISORDER OF RIGHT KNEE: ICD-10-CM

## 2024-04-10 DIAGNOSIS — M25.461 EFFUSION OF RIGHT KNEE: ICD-10-CM

## 2024-04-10 DIAGNOSIS — M22.41 CHONDROMALACIA OF RIGHT PATELLA: ICD-10-CM

## 2024-04-10 DIAGNOSIS — S86.109A TRAUMATIC INJURY OF POPLITEUS MUSCLE: Primary | ICD-10-CM

## 2024-04-10 DIAGNOSIS — M76.891 POPLITEUS TENDINITIS OF RIGHT LOWER EXTREMITY: ICD-10-CM

## 2024-04-10 DIAGNOSIS — M25.561 ACUTE PAIN OF RIGHT KNEE: ICD-10-CM

## 2024-04-10 DIAGNOSIS — Q66.6 VALGUS DEFORMITY OF BOTH FEET: ICD-10-CM

## 2024-04-10 DIAGNOSIS — S83.241D ACUTE MEDIAL MENISCUS TEAR OF RIGHT KNEE, SUBSEQUENT ENCOUNTER: ICD-10-CM

## 2024-04-10 DIAGNOSIS — M67.863 TENDINOSIS OF RIGHT KNEE: ICD-10-CM

## 2024-04-10 DIAGNOSIS — M25.361 KNEE INSTABILITY, RIGHT: ICD-10-CM

## 2024-04-10 DIAGNOSIS — S83.411S SPRAIN OF MEDIAL COLLATERAL LIGAMENT OF RIGHT KNEE, SEQUELA: ICD-10-CM

## 2024-04-10 DIAGNOSIS — M17.11 OSTEOARTHRITIS OF RIGHT KNEE, UNSPECIFIED OSTEOARTHRITIS TYPE: ICD-10-CM

## 2024-04-10 DIAGNOSIS — S86.111A GASTROCNEMIUS STRAIN, RIGHT, INITIAL ENCOUNTER: ICD-10-CM

## 2024-04-10 DIAGNOSIS — M62.9 HAMSTRING TIGHTNESS OF BOTH LOWER EXTREMITIES: ICD-10-CM

## 2024-04-10 DIAGNOSIS — M70.51 SEMIMEMBRANOSUS BURSITIS OF RIGHT KNEE: ICD-10-CM

## 2024-04-10 DIAGNOSIS — M17.11 PRIMARY LOCALIZED OSTEOARTHRITIS OF RIGHT KNEE: ICD-10-CM

## 2024-04-10 DIAGNOSIS — M21.70 LEG LENGTH DISCREPANCY: ICD-10-CM

## 2024-04-10 PROBLEM — S83.411A SPRAIN OF MEDIAL COLLATERAL LIGAMENT OF RIGHT KNEE: Status: ACTIVE | Noted: 2024-04-10

## 2024-04-10 PROCEDURE — 99214 OFFICE O/P EST MOD 30 MIN: CPT | Performed by: FAMILY MEDICINE

## 2024-04-10 ASSESSMENT — LIFESTYLE VARIABLES
SKIP TO QUESTIONS 9-10: 1
HOW OFTEN DURING THE LAST YEAR HAVE YOU NEEDED AN ALCOHOLIC DRINK FIRST THING IN THE MORNING TO GET YOURSELF GOING AFTER A NIGHT OF HEAVY DRINKING: NEVER
HOW OFTEN DURING THE LAST YEAR HAVE YOU HAD A FEELING OF GUILT OR REMORSE AFTER DRINKING: NEVER
HOW OFTEN DURING THE LAST YEAR HAVE YOU FAILED TO DO WHAT WAS NORMALLY EXPECTED FROM YOU BECAUSE OF DRINKING: NEVER
HOW OFTEN DO YOU HAVE SIX OR MORE DRINKS ON ONE OCCASION: NEVER
HOW OFTEN DURING THE LAST YEAR HAVE YOU FOUND THAT YOU WERE NOT ABLE TO STOP DRINKING ONCE YOU HAD STARTED: NEVER
HOW OFTEN DO YOU HAVE A DRINK CONTAINING ALCOHOL: NEVER
HOW OFTEN DURING THE LAST YEAR HAVE YOU BEEN UNABLE TO REMEMBER WHAT HAPPENED THE NIGHT BEFORE BECAUSE YOU HAD BEEN DRINKING: NEVER
HAVE YOU OR SOMEONE ELSE BEEN INJURED AS A RESULT OF YOUR DRINKING: NO
HOW MANY STANDARD DRINKS CONTAINING ALCOHOL DO YOU HAVE ON A TYPICAL DAY: PATIENT DOES NOT DRINK
AUDIT-C TOTAL SCORE: 0
AUDIT TOTAL SCORE: 0
HAS A RELATIVE, FRIEND, DOCTOR, OR ANOTHER HEALTH PROFESSIONAL EXPRESSED CONCERN ABOUT YOUR DRINKING OR SUGGESTED YOU CUT DOWN: NO

## 2024-04-10 ASSESSMENT — ENCOUNTER SYMPTOMS
OCCASIONAL FEELINGS OF UNSTEADINESS: 0
DEPRESSION: 0
LOSS OF SENSATION IN FEET: 0

## 2024-04-10 ASSESSMENT — PAIN SCALES - GENERAL
PAINLEVEL: 4
PAINLEVEL_OUTOF10: 4

## 2024-04-10 ASSESSMENT — PAIN DESCRIPTION - DESCRIPTORS: DESCRIPTORS: ACHING

## 2024-04-10 ASSESSMENT — COLUMBIA-SUICIDE SEVERITY RATING SCALE - C-SSRS
2. HAVE YOU ACTUALLY HAD ANY THOUGHTS OF KILLING YOURSELF?: NO
6. HAVE YOU EVER DONE ANYTHING, STARTED TO DO ANYTHING, OR PREPARED TO DO ANYTHING TO END YOUR LIFE?: NO
1. IN THE PAST MONTH, HAVE YOU WISHED YOU WERE DEAD OR WISHED YOU COULD GO TO SLEEP AND NOT WAKE UP?: NO

## 2024-04-10 ASSESSMENT — PAIN - FUNCTIONAL ASSESSMENT: PAIN_FUNCTIONAL_ASSESSMENT: 0-10

## 2024-04-10 NOTE — PATIENT INSTRUCTIONS
Treatment or Intervention:   May continue to alternate ice and moist heat as needed  Reviewed handout degenerative joint disease of the knees, patellofemoral tracking syndrome, and  meniscus injury in detail with the patient at the time of this office visit.    Stressed the importance of starting Physical Therapy 1-2 times a week for 8-10 weeks with manual therapy as well as dry needling and IASTM    Once again, reviewed home exercises to be performed by the patient routinely   Once again, stressed the importance of wearing shoes with good stability control to help with the biomechanics affecting the knees and lower extremities    Once again, stressed the importance of wearing full foot insoles to help with the biomechanics affecting the knees and lower extremities    Recommend to start over-the-counter calcium with vitamin-D 2 -3000+ milligrams a day, as well as OTC symphytum as directed daily to promote bony healing, in addition to a daily multivitamin.    Recommended to start over-the-counter curcumin, turmeric, boswellia, as well as egg shell membrane as directed to aid with joint inflammation.   Recommended to start over-the-counter Move Free for joint health.    Continue to take  Ibuprofen 800 milligrams, taking one every 8 hours with food as needed,   The patient may alternate with OTC Tylenol Extra Strength or OTC Tylenol Arthritis, taking one every 6-8 hours with food as needed.    Patient advised regarding the risks and/or potential adverse reactions and/or side effects of any prescribed medications along with any over-the-counter medications or any supplements used. Patient advised to seek immediate medical care if any adverse reactions occur. The patient and/or patient(s) parent(s) verbalized their understanding   Reviewed MRI of the RIGHT  knee in detail with the patient to the level of their understanding.   Once again, discussed in detail with the patient to the level of their understanding the  possibility in the future of regenerative injections versus corticosteroid injections versus viscosupplementation injections versus a combination    When patient is able to equally weight bear, will order standing erect pelvis XR and  will provide appropriate __ millimeter heel lift to be placed in the RIGHT/LEFT shoe to accommodate for leg length discrepancy found on standing erect pelvis xray   Once again, recommended to wear supportive shoes with insoles at work instead of wearing dress shoes.   Staff to initiate prior authorization process for viscosupplementation injections into the patients RIGHT/LEFT/BILATERAL knee under ultrasound. Staff will contact the patient to schedule injections once approval is received. and Patient to decide whether or not they will alternate with regenerative Prolozone injections as they are not covered by insurance; a price quote for these out-of-pocket expenses has been provided to the patient at the time of this office visit.   Recommendation to purchase through the office or online some type of red light therapy also known as Low Level Laser Therapy (LLLT), Biostimulation (BIOS), Photonic Stimulation or simply Light Box Therapy to be used a couple times a day to help activate regenerative healing     Follow up once viscosupplementation injections are approved for the right knee.

## 2024-04-11 NOTE — TELEPHONE ENCOUNTER
04/11/2024- Received Confidential FAX APPROVAL from [employer]. Patient was APPROVED for EUFLEXXA INJECTIONS into his RIGHT KNEE.     Auth Approval Number: [id number]  Dates: 04/10/2024-10/06/2024    Please call patient to schedule 1x a week for 3 weeks EUFLEXXA into his RIGHT KNEE.     Please Offer Cortisone Injection 1st if not alternating with Prolozone then start Euflexxa series 2 weeks later if patient schedules Cortisone.     APPROVAL SCANNED INTO CHART

## 2024-04-16 ENCOUNTER — EVALUATION (OUTPATIENT)
Dept: PHYSICAL THERAPY | Facility: CLINIC | Age: 49
End: 2024-04-16
Payer: COMMERCIAL

## 2024-04-16 DIAGNOSIS — M22.8X1 PATELLAR TRACKING DISORDER OF RIGHT KNEE: ICD-10-CM

## 2024-04-16 DIAGNOSIS — S86.111A GASTROCNEMIUS STRAIN, RIGHT, INITIAL ENCOUNTER: ICD-10-CM

## 2024-04-16 DIAGNOSIS — S83.511A SPRAIN OF ANTERIOR CRUCIATE LIGAMENT OF RIGHT KNEE, INITIAL ENCOUNTER: ICD-10-CM

## 2024-04-16 DIAGNOSIS — M24.559 HIP FLEXOR TIGHTNESS, UNSPECIFIED LATERALITY: ICD-10-CM

## 2024-04-16 DIAGNOSIS — M17.11 PRIMARY LOCALIZED OSTEOARTHRITIS OF RIGHT KNEE: ICD-10-CM

## 2024-04-16 DIAGNOSIS — Q66.6 VALGUS DEFORMITY OF BOTH FEET: ICD-10-CM

## 2024-04-16 DIAGNOSIS — S83.241A TEAR OF MEDIAL MENISCUS OF RIGHT KNEE, UNSPECIFIED TEAR TYPE, UNSPECIFIED WHETHER OLD OR CURRENT TEAR, INITIAL ENCOUNTER: ICD-10-CM

## 2024-04-16 DIAGNOSIS — S86.109A TRAUMATIC INJURY OF POPLITEUS MUSCLE: ICD-10-CM

## 2024-04-16 DIAGNOSIS — M21.70 LEG LENGTH DISCREPANCY: ICD-10-CM

## 2024-04-16 DIAGNOSIS — M67.863 TENDINOSIS OF RIGHT KNEE: ICD-10-CM

## 2024-04-16 DIAGNOSIS — M62.9 HAMSTRING TIGHTNESS OF BOTH LOWER EXTREMITIES: ICD-10-CM

## 2024-04-16 DIAGNOSIS — M25.561 ACUTE PAIN OF RIGHT KNEE: ICD-10-CM

## 2024-04-16 DIAGNOSIS — M25.461 EFFUSION OF RIGHT KNEE: ICD-10-CM

## 2024-04-16 DIAGNOSIS — M25.361 KNEE INSTABILITY, RIGHT: ICD-10-CM

## 2024-04-16 DIAGNOSIS — M70.51 SEMIMEMBRANOSUS BURSITIS OF RIGHT KNEE: ICD-10-CM

## 2024-04-16 PROCEDURE — 97110 THERAPEUTIC EXERCISES: CPT | Mod: GP

## 2024-04-16 PROCEDURE — 97161 PT EVAL LOW COMPLEX 20 MIN: CPT | Mod: GP

## 2024-04-16 ASSESSMENT — PAIN SCALES - GENERAL: PAINLEVEL_OUTOF10: 0 - NO PAIN

## 2024-04-16 ASSESSMENT — PAIN - FUNCTIONAL ASSESSMENT: PAIN_FUNCTIONAL_ASSESSMENT: 0-10

## 2024-04-23 ENCOUNTER — APPOINTMENT (OUTPATIENT)
Dept: PHYSICAL THERAPY | Facility: CLINIC | Age: 49
End: 2024-04-23
Payer: COMMERCIAL

## 2024-04-30 ENCOUNTER — APPOINTMENT (OUTPATIENT)
Dept: PHYSICAL THERAPY | Facility: CLINIC | Age: 49
End: 2024-04-30
Payer: COMMERCIAL

## 2024-05-07 ENCOUNTER — APPOINTMENT (OUTPATIENT)
Dept: PHYSICAL THERAPY | Facility: CLINIC | Age: 49
End: 2024-05-07
Payer: COMMERCIAL

## 2024-05-14 ENCOUNTER — APPOINTMENT (OUTPATIENT)
Dept: PHYSICAL THERAPY | Facility: CLINIC | Age: 49
End: 2024-05-14
Payer: COMMERCIAL

## 2024-05-21 ENCOUNTER — APPOINTMENT (OUTPATIENT)
Dept: PHYSICAL THERAPY | Facility: CLINIC | Age: 49
End: 2024-05-21
Payer: COMMERCIAL

## 2024-05-28 ENCOUNTER — APPOINTMENT (OUTPATIENT)
Dept: PHYSICAL THERAPY | Facility: CLINIC | Age: 49
End: 2024-05-28
Payer: COMMERCIAL

## 2024-06-04 ENCOUNTER — APPOINTMENT (OUTPATIENT)
Dept: PHYSICAL THERAPY | Facility: CLINIC | Age: 49
End: 2024-06-04
Payer: COMMERCIAL

## 2024-06-11 ENCOUNTER — APPOINTMENT (OUTPATIENT)
Dept: PHYSICAL THERAPY | Facility: CLINIC | Age: 49
End: 2024-06-11
Payer: COMMERCIAL

## 2024-06-26 ENCOUNTER — DOCUMENTATION (OUTPATIENT)
Dept: PHYSICAL THERAPY | Facility: CLINIC | Age: 49
End: 2024-06-26
Payer: COMMERCIAL

## 2024-06-26 NOTE — PROGRESS NOTES
Discharge Summary    Name: Pipe Izaguirre  MRN: [id number]  : 1975  Date: 24    Discharge Summary: PT    Discharge Information: Date of discharge 24 and Date of last visit 24    Therapy Summary: Pt not to therapy since above date. Pt canceled/did not attend any remaining sessions.    Rehab Discharge Reason: Failed to schedule and/or keep follow-up appointment(s)

## 2024-09-02 NOTE — TELEPHONE ENCOUNTER
04/10/2024- Initiated benefits investigation request via [id number] for Viscosupplementation Injections into the patients RIGHT KNEE.    RECEIVED AUTO APPROVAL for EUFLEXXA INJECTIONS INTO THE PATIENTS RIGHT KNEE.    Dates: 04/10/2024- 10/06/2024  Auth: [id number]    Waiting on FAX Approval.      Please review. Protocol Failed; No Protocol    Requested Prescriptions   Pending Prescriptions Disp Refills    METFORMIN  MG Oral Tablet 24 Hr [Pharmacy Med Name: Metformin Hydrochloride Er 24hr 500 Mg Tab Gran] 180 tablet 0     Sig: TAKE 2 TABLETS BY MOUTH ONCE DAILY WITH BREAKFAST       Diabetes Medication Protocol Failed - 8/30/2024 10:12 AM        Failed - Last A1C < 7.5 and within past 6 months     Lab Results   Component Value Date    A1C 7.1 (A) 10/17/2023             Passed - In person appointment or virtual visit in the past 6 mos or appointment in next 3 mos     Recent Outpatient Visits              1 month ago Medication monitoring encounter    [facility] Rafat French MD    Office Visit    2 months ago Viral URI    [facility], [facility], [facility] Rell Stallings Jr., APN    Office Visit    4 months ago Atopic dermatitis, unspecified type    [facility] Rafat French MD    Office Visit    4 months ago Vasculogenic erectile dysfunction, unspecified vasculogenic erectile dysfunction type    [facility] Libertad Vasquez MD    Office Visit    4 months ago Rash and other nonspecific skin eruption    [facility], [place] Rafat French MD    Office Visit          Future Appointments         Provider Department Appt Notes    In 4 weeks Rafat French MD [facility] Full body skin exam    In 1 month Aaron Gold MD [facility]     In 1 month Libertad Vasquez MD [facility] 6 mo f/u                    Passed - Microalbumin procedure in past 12 months or taking ACE/ARB        Passed - EGFRCR or GFRNAA > 50     GFR Evaluation  EGFRCR: 72 , resulted on 4/1/2024           Passed - GFR in the past 12 months               Future Appointments         Provider Department Appt Notes    In 4 weeks Rafat French MD [facility] Full body skin exam    In 1 month Aaron Gold MD [facility]     In 1 month Libertad Vasquez MD [facility] 6 mo f/u          Recent Outpatient Visits              1 month ago Medication monitoring encounter    [facility] Rafat French MD    Office Visit    2 months ago Viral URI    [facility], Walk[facility]In Clinic, [facility] Rell Stallings Jr., APN    Office Visit    4 months ago Atopic dermatitis, unspecified type    [facility] Rafat French MD    Office Visit    4 months ago Vasculogenic erectile dysfunction, unspecified vasculogenic erectile dysfunction type    [facility] Libertad Vasquez MD    Office Visit    4 months ago Rash and other nonspecific skin eruption    [facility], [address], [place] Rafat French MD    Office Visit

## 2025-06-30 ENCOUNTER — OFFICE VISIT (OUTPATIENT)
Dept: ORTHOPEDIC SURGERY | Facility: CLINIC | Age: 50
End: 2025-06-30
Payer: COMMERCIAL

## 2025-06-30 DIAGNOSIS — M54.12 CERVICAL RADICULOPATHY: Primary | ICD-10-CM

## 2025-06-30 PROCEDURE — 99203 OFFICE O/P NEW LOW 30 MIN: CPT | Performed by: ORTHOPAEDIC SURGERY

## 2025-06-30 PROCEDURE — 1036F TOBACCO NON-USER: CPT | Performed by: ORTHOPAEDIC SURGERY

## 2025-06-30 ASSESSMENT — ENCOUNTER SYMPTOMS
FATIGUE: 0
NECK PAIN: 1
ARTHRALGIAS: 1
FEVER: 0
CHILLS: 0
BRUISES/BLEEDS EASILY: 0
NUMBNESS: 1
NECK STIFFNESS: 1
SHORTNESS OF BREATH: 0
WHEEZING: 0

## 2025-06-30 ASSESSMENT — PAIN SCALES - GENERAL: PAINLEVEL_OUTOF10: 9

## 2025-06-30 ASSESSMENT — PAIN - FUNCTIONAL ASSESSMENT: PAIN_FUNCTIONAL_ASSESSMENT: 0-10

## 2025-06-30 NOTE — PROGRESS NOTES
Reason for Appointment  Chief Complaint   Patient presents with    Left Shoulder - Pain     Pinched nerve     History of Present Illness  New patient is a 49 y.o. male here today for evaluation of left shoulder pain. Today he states he has shoulder blade, trap, and medial scapula pain. He has radicular symptoms all the way down to his hands. This started 2 weeks ago. Reports stress knots in the upper back. He uses a massage gun with some relief. He got a massage on Friday and had aggravation. He has stiffness in the neck. Small finger and ring finger numbness. Report left elbow injury when he was a child. The pt reports no other recent falls or injuries. Past medical history allergies medications social and family history all reviewed.       Medical History[1]    Surgical History[2]    Medication Documentation Review Audit       Reviewed by Erin Schroeder MA (Medical Assistant) on 06/30/25 at 1324      Medication Order Taking? Sig Documenting Provider Last Dose Status   sertraline (Zoloft) 100 mg tablet [id number] Yes Take 3 tablets (300 mg) by mouth once daily in the morning. Take before meals. Historical Provider, MD Taking Active                    RX Allergies[3]    Review of Systems   Constitutional:  Negative for chills, fatigue and fever.   Respiratory:  Negative for shortness of breath and wheezing.    Cardiovascular:  Negative for chest pain and leg swelling.   Musculoskeletal:  Positive for arthralgias, neck pain and neck stiffness.   Allergic/Immunologic: Negative for immunocompromised state.   Neurological:  Positive for numbness.   Hematological:  Does not bruise/bleed easily.       Exam   Pt is alert awake, orientated to person place and time. No acute distress. Mood is good. Good pulses and good sensation. Negative Batista's sign. No auditory wheezes. No JVD.  No hyperreflexia. No skin changes. Good capillary refill. deformity of the left elbow and lacks extension. Good cuff strength. Good biceps  triceps strength. Stiffness in the neck.     Assessment   Cervical radiculopathy    Plan     The pt is having radicular symptoms consistent with a pinched nerve in the neck, we referred the pt to pain management for spinal intervention.       I, Mayra Mg, attest that this documentation has been prepared under the direction and in the presence of Tashi Rey MD.   By signing below, I, Tashi Rey MD, personally performed the services described in this documentation. All medical record entries made by the scribe were at my direction and in my presence. I have reviewed the chart and agree that the record reflects my personal performance and is accurate and complete.         [1]   Past Medical History:  Diagnosis Date    Depression    [2] History reviewed. No pertinent surgical history.  [3] No Known Allergies

## 2025-07-02 ENCOUNTER — HOSPITAL ENCOUNTER (OUTPATIENT)
Dept: RADIOLOGY | Facility: CLINIC | Age: 50
Discharge: HOME | End: 2025-07-02
Payer: COMMERCIAL

## 2025-07-02 ENCOUNTER — OFFICE VISIT (OUTPATIENT)
Dept: ORTHOPEDIC SURGERY | Facility: CLINIC | Age: 50
End: 2025-07-02
Payer: COMMERCIAL

## 2025-07-02 VITALS
DIASTOLIC BLOOD PRESSURE: 76 MMHG | HEART RATE: 82 BPM | WEIGHT: 180 LBS | BODY MASS INDEX: 25.77 KG/M2 | SYSTOLIC BLOOD PRESSURE: 116 MMHG | HEIGHT: 70 IN

## 2025-07-02 DIAGNOSIS — R29.898 LEFT ARM WEAKNESS: ICD-10-CM

## 2025-07-02 DIAGNOSIS — S46.812A TRAPEZIUS STRAIN, LEFT, INITIAL ENCOUNTER: ICD-10-CM

## 2025-07-02 DIAGNOSIS — M79.2 RADICULAR PAIN IN LEFT ARM: ICD-10-CM

## 2025-07-02 DIAGNOSIS — M54.14 THORACIC RADICULITIS: ICD-10-CM

## 2025-07-02 DIAGNOSIS — S46.812A STRAIN OF LEFT LEVATOR SCAPULAE MUSCLE, INITIAL ENCOUNTER: ICD-10-CM

## 2025-07-02 DIAGNOSIS — M54.2 CERVICALGIA: ICD-10-CM

## 2025-07-02 DIAGNOSIS — M50.30 BULGING OF CERVICAL INTERVERTEBRAL DISC: ICD-10-CM

## 2025-07-02 DIAGNOSIS — M51.34 DISCOGENIC THORACIC PAIN: ICD-10-CM

## 2025-07-02 DIAGNOSIS — S16.1XXA CERVICAL STRAIN, ACUTE, INITIAL ENCOUNTER: ICD-10-CM

## 2025-07-02 PROCEDURE — 99204 OFFICE O/P NEW MOD 45 MIN: CPT | Performed by: FAMILY MEDICINE

## 2025-07-02 PROCEDURE — 72070 X-RAY EXAM THORAC SPINE 2VWS: CPT | Performed by: RADIOLOGY

## 2025-07-02 PROCEDURE — 72050 X-RAY EXAM NECK SPINE 4/5VWS: CPT

## 2025-07-02 PROCEDURE — 72070 X-RAY EXAM THORAC SPINE 2VWS: CPT

## 2025-07-02 PROCEDURE — 99215 OFFICE O/P EST HI 40 MIN: CPT | Performed by: FAMILY MEDICINE

## 2025-07-02 PROCEDURE — 72050 X-RAY EXAM NECK SPINE 4/5VWS: CPT | Performed by: RADIOLOGY

## 2025-07-02 PROCEDURE — 1036F TOBACCO NON-USER: CPT | Performed by: FAMILY MEDICINE

## 2025-07-02 PROCEDURE — 3008F BODY MASS INDEX DOCD: CPT | Performed by: FAMILY MEDICINE

## 2025-07-02 RX ORDER — CYCLOBENZAPRINE HCL 10 MG
10 TABLET ORAL 3 TIMES DAILY PRN
Qty: 90 TABLET | Refills: 1 | Status: SHIPPED | OUTPATIENT
Start: 2025-07-02 | End: 2025-08-01

## 2025-07-02 RX ORDER — METHYLPREDNISOLONE ACETATE 40 MG/ML
40 INJECTION, SUSPENSION INTRA-ARTICULAR; INTRALESIONAL; INTRAMUSCULAR; SOFT TISSUE ONCE
Status: SHIPPED | OUTPATIENT
Start: 2025-07-02

## 2025-07-02 RX ORDER — METHYLPREDNISOLONE SODIUM SUCCINATE 125 MG/2ML
125 INJECTION INTRAMUSCULAR; INTRAVENOUS ONCE
Status: SHIPPED | OUTPATIENT
Start: 2025-07-02

## 2025-07-02 RX ORDER — KETOROLAC TROMETHAMINE 30 MG/ML
30 INJECTION, SOLUTION INTRAMUSCULAR; INTRAVENOUS ONCE
Status: SHIPPED | OUTPATIENT
Start: 2025-07-02 | End: 2025-07-07

## 2025-07-02 RX ORDER — PREDNISONE 20 MG/1
TABLET ORAL
Qty: 30 TABLET | Refills: 0 | Status: SHIPPED | OUTPATIENT
Start: 2025-07-02 | End: 2025-07-12

## 2025-07-02 RX ORDER — TRAMADOL HYDROCHLORIDE 50 MG/1
50 TABLET, FILM COATED ORAL EVERY 8 HOURS PRN
Qty: 15 TABLET | Refills: 0 | Status: SHIPPED | OUTPATIENT
Start: 2025-07-02 | End: 2025-07-07

## 2025-07-02 ASSESSMENT — PATIENT HEALTH QUESTIONNAIRE - PHQ9
SUM OF ALL RESPONSES TO PHQ9 QUESTIONS 1 AND 2: 0
1. LITTLE INTEREST OR PLEASURE IN DOING THINGS: NOT AT ALL
2. FEELING DOWN, DEPRESSED OR HOPELESS: NOT AT ALL

## 2025-07-02 ASSESSMENT — COLUMBIA-SUICIDE SEVERITY RATING SCALE - C-SSRS
2. HAVE YOU ACTUALLY HAD ANY THOUGHTS OF KILLING YOURSELF?: NO
1. IN THE PAST MONTH, HAVE YOU WISHED YOU WERE DEAD OR WISHED YOU COULD GO TO SLEEP AND NOT WAKE UP?: NO
6. HAVE YOU EVER DONE ANYTHING, STARTED TO DO ANYTHING, OR PREPARED TO DO ANYTHING TO END YOUR LIFE?: NO

## 2025-07-02 ASSESSMENT — ENCOUNTER SYMPTOMS
DEPRESSION: 0
OCCASIONAL FEELINGS OF UNSTEADINESS: 0
LOSS OF SENSATION IN FEET: 0

## 2025-07-02 ASSESSMENT — PAIN SCALES - GENERAL: PAINLEVEL_OUTOF10: 4

## 2025-07-02 NOTE — PATIENT INSTRUCTIONS
May continue to alternate ice and moist heat as needed  Reviewed herniated/bulging disc(s) in injury detail with the patient to the level of their understanding at the time of this office visit.   Start into physical therapy 1-2 times a week for 10-12 weeks with manual therapy, dry needling, IASTM, and traction   Reviewed home stretching exercises to be performed by the patient routinely  Stressed the importance of wearing shoes with good stability control to help with the biomechanics affecting the spine  Stressed the importance of wearing full foot insoles to help with the biomechanics affecting the spine and to provide cushioning  Recommendation over-the-counter calcium with vitamin-D 2 -3000+ milligrams a day, as well as OTC symphytum as directed daily to promote bony healing, in addition to a daily multivitamin.  Recommendation over-the-counter curcumin, turmeric, boswellia, as well as egg shell membrane as directed to aid with joint inflammation.  Recommendation over-the-counter Move Free for joint health.   May take OTC Tylenol Extra Strength or OTC Tylenol Arthritis, taking one every 6-8 hours with food as needed  The following prescription was sent to the patients pharmacy of record:  10 DAY TAPER:  The following prescription was electronically sent to the patient's pharmacy of record: Prednisone 20mg, take 4 tablets a day x 4 days, 3 tabletss a day x 3 days, 2 tablets a day x 2 days, and 1 tablet a day x 1 day with food; start tomorrow if received Solu-Medrol injection in the office at visit, otherwise start immediately, dispense quantity sufficient, with no refills   The following prescription was electronically sent to the patient's pharmacy of record: Flexeril (Cyclobenzaprine) 10 milligrams, may take 1 tablet 1-2 hours before bedtime as needed for muscle relaxation, Duration: 30 days, Dispense: #30, Refill: 0  TREATMENT PLAN: Patient received IM injection of SoluMedrol 125 milligrams at the time of  this office visit. , Patient received IM injection of Toradol 60 milligrams at the time of this office visit., and Patient received IM injection of DepoMedrol 40 milligrams at the time of this office visit.  Tramadol 50mg 1/2-1 pill every 8 hours only for severe pain PRN x 5 days, dispense 15, no refills   Patient advised regarding the risks and/or potential adverse reactions and/or side effects of any prescribed medications along with any over-the-counter medications or any supplements used. Patient advised to seek immediate medical care if any adverse reactions occur. The patient and/or patient(s) parent(s) verbalized their understanding  Discussed in detail with the patient to the level of their understanding the possibility in the future of a home cervical traction device  You have been ordered an MRI of the cervical and thoracic spine. Once you contact scheduling at [phone] and obtain the date and time of your MRI, contact our office at [phone] to schedule your follow-up appointment to review your results. Please note the results of your imaging will not be discussed over the telephone.  Follow-up after MRI

## 2025-07-02 NOTE — PROGRESS NOTES
Verbal consent of the patient and/or verbal parental consent for patients under the age of 18 have been obtained to conduct a physical examination at this office visit.    The  Jorge SÁNCHEZ was present in the room during the entire visit including, but not limited to the physical examination.    Established patient    History Of Present Illness  07/02/25 Pipe Izaguirre is a 49 y.o. 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  He states that he has been having 8/10 pain for the last 3 weeks. States that he began having pain in the left shoulder blade and then had a massage recently after and flared up even more. States that he is getting radicular pain into his left arm and hand with loss of strength. States that he was treated for pain at [facility] on Monday 6/30 where xrays were performed and emailed to Dr. Barrera.  We discussed his x-ray results that showed significant arthritic changes not only in his cervical spine but also his thoracic and lumbar spine.  Is concerned more right now is of the cervical spine and the radicular symptoms and weakness and numbness and tingling going into his left arm/hand.  He says recently his mother has passed away very quickly and he was dealing with lots of medical issues with that as well as a lot of stress and his trap flared up on his left side and he was unable to workout or do any activities for approximately 6 months and all the weight he lost and muscle he gained completely went the opposite way because he could not workout or do anything because 6 months and he was extremely stressed.  He went to get a massage done and afterwards everything happened that he developed the neck pain as well as the radicular symptoms.  We talked about stress and how it can affect the body as well as him having neuro psychological event due to stress affecting him neurologically.  I  additionally told him it seems like that he probably most likely has some herniated or bulging disks that have been aggravated that we should get some imaging done as well as starting him into physical therapy even though he has been going to chiropractic care for some time and getting therapy.  Tylenol and NSAIDs does not seem to help so we will start him on some oral steroids and potentially down the line to get him in for epidural injections if needed.    All previous Progress Notes and imaging results related to this patients chief complaint have been reviewed in preparation for this examination.    Past Medical History  He has a past medical history of Depression.    Surgical History  He has no past surgical history on file.     Social History  He reports that he has never smoked. He has never been exposed to tobacco smoke. He has never used smokeless tobacco. He reports that he does not currently use alcohol. Drug use questions deferred to the physician.    Family History  Family History[1]     Allergies  Patient has no known allergies.    Historical Clinical Intake    Review of Systems  CONSTITUTIONAL:   Negative for weight change, loss of appetite, fatigue, weakness, fever, chills, night sweats, headaches .           HEENT:   Negative for cold, cough, sore throat, sinus pain, swollen lymph nodes.           OPHTHALMOLOGY:   Negative for diminished vision, blurred vision, loss of vision, double vision.           ALLERGY:   Negative for runny nose, scratchy throat, sinus congestion, rash, facial pressure, nasal congestion, post-nasal drip.           CARDIOLOGY:   Negative for chest pain, palpitations, murmurs, irregular heart beat, shortness of breath, leg edema, dyspnea on exertion, fatigue, dizziness.           RESPIRATORY:   Negative for chest pain, shortness of breath, swelling of the legs, asthma/copd, chest congestion, pain with breathing .           GASTROENTEROLOGY:   Negative for nausea, vomitting,  heartburn, constipation, diarrhea, blood in stool, change in bowel habits, black stool.           HEMATOLOGY/LYMPH:   Negative for fatigue, loss of appetitie, easy bruising, easy bleeding, anemia, abnormal bleeding, slow healing.           ENDOCRINOLOGY:   Negative for polyuria, polydipsia, polyphagia, fatigue, weight loss, weight gain, cold intolerance, heat intolerance, diabetes.           MUSCULOSKELETAL:   Positive  for Left Shoudler and mid/upper back, and neck pain        DERMATOLOGY:   Negative for rash, bruising.           NEUROLOGY:   Negative for tingling, numbness, gait abnormality, paresthesias, weakness, sciatica.        Examination:  CERVICAL and THORACIC C2, C3, C4, C5, C6, C7, and T1  Erythema: Negative.   Edema: Negative.   Effusion: Negative.   TART Findings: Positive  Tissue Texture Changes, Asymmetry, Restriction, Tenderness lower cervical spine and upper thoracic on the left.   Warmth: Negative.   Ecchymosis/Bruising: Negative.   Percussion Test (CERVICAL): Negative.   Tuning Fork Test (CERVICAL): Negative.   Percussion Test (THORACIC):  Negative.   Tuning Fork Test (THORACIC):  Negative.   Abrasions: Negative.     Orientation (CERVICAL): Positive decreased cervical lordosis due to muscle spasms  Orientation (THORACIC): Positive decreased thoracic kyphosis due to muscle spasms    ROM (CERVICAL):  Positive FROM, Forward Flexion, Extension, and Lateral Bending (Side Bending) however pain noted especially with sidebending and rotation and when added and extension makes it worse.     ROM (THORACIC):   FROM, Flexion, Extension, Rotation, and Side Bending.     Muscle Strength:   Negative: Cervical Flexion, Extension, Left Rotation, Right Rotation, Left Lateral Flexion, Right Lateral Flexion, Trapezius (Shrug), Anterior Deltoid, Posterior Deltoid and Lateral Deltoid.          DTR/Neurological: Symmetrical  +2/+4 Biceps Reflex (C5)  +2/+4 Brachioradialis Reflex (C6)  +2/+4 Triceps Reflex (C7).      Sensation/Neurological Cervical:   Positive decreased left side  C2: Lower jaw and back of head  C3: Upper neck and back of head Positive decreases left side   C4: Lower neck, upper shoulders, and upper chest Positive decreases left side    C5: Area of collarbones, lateral upper arms, and upper chest, medial boarder of scapula: Positive decreases left side    C6: Lateral forearms, thumbs, anterior index finger, and lateral half of the middle finger Positive decreases left side    C7: Some of posterior index finger, medial half of middle finger, upper posterior back, and back of arms Positive decreases left side    C8: Ring finger, little finger, medial forearm, and posterior upper back. Positive decreases left side     Sensation/Neurological Thoracic:   Positive decreased left side  T1: Medial anterior upper arm, axilla, upper chest and posterior back Positive    T2: Upper chest and posterior back  T3: Upper chest and posterior back  T4: Upper chest (nipple area) and posterior back  T5: Mid chest and posterior back  T6: Mid chest and posterior back  T7: Mid chest and posterior back  T8: Upper abdomen and middle posterior back  T9: Upper abdomen and middle posterior back  T10: Abdomen (area of belly button) and middle posterior back  T11: Abdomen and middle posterior back  T12: Lower abdomen and middle posterior back.     Palpation:   Negative Tenderness to Palpation.     Vascular:   +2/+4,Carotid  +2/+4 Radial  Capillary Refill< 2 seconds.     Cervicle Spine Tests:  Lhermitte's Sign: Negative.   Spurling's Test (Atlanto-Axial Compression Test): Positive  .   Reverse Spurling's Test: Positive  .   Adson's Test: Positive    Harvey's Test: Positive    Corbin's Test: Negative.   Costoclavicular Test: Negative.   Cervical Spine Distraction Test: Positive    Tinel's Sign: Negative.   Brachial Plexus Tension Test: Negative.   Brachial Plexus Stretch Test: Negative.   Neutral Axial Compression Test: Negative.   Shoulder  ABduction (Bakody) Test: Negative.   Cervical Compression with Max Foraminal Compression Test: Negative.   Intervertebral Foramina Compression Test: Negative.   Flexion Compression Test: Positive  .   Extension Compression Test: Positive  .         Thoracic Spine Tests:  Vargas Forward Bend Test: Positive  .   Brudzinski's/Kernig's Test: Positive  .   Slump Test: Positive  .   First Thoracic Neve Root Test: Positive  .   Passive Scapular Approximation: Negative.   Scapular Elevation Test: Negative.   Scapular Retraction Test: Negative.   Scapular Protraction (Winging): Negative.   Costoclavicular Test: Negative.   Elevated Arms Stress Test (Suki Test): Negative.   Kyphosis Test on Hands and Knees: Negative.   Segmental Function in the Thoracic Spine in Extension Test: Positive      Chest Tests:  Sternum Compression Test: Negative.   Costosternal Pressure Test: Negative.   Rib Compression Test: Negative.   Schepelmann Test: Negative.   Pectoralis Major Contracture Test: Negative.   Chest Circumference Test: Negative.       ------------------------------------------------------    Imaging and Diagnostics Review:   Xrays images were obtained by [facility], showed severe degenerative changes throughout the cervical, thoracic, and lumbar spine new images ordered of the cervical, thoracic, and left shoulder.  Additionally MRI of the cervical and thoracic spine.    Assessment   1. Cervicalgia  methylPREDNISolone sodium succinate (PF) (SOLU-Medrol) injection 125 mg    methylPREDNISolone acetate (DEPO-Medrol) injection 40 mg    ketorolac (Toradol) injection 30 mg    predniSONE (Deltasone) 20 mg tablet    cyclobenzaprine (Flexeril) 10 mg tablet    Referral to Physical Therapy    MR cervical spine wo IV contrast    XR cervical spine complete 4-5 views    XR thoracic spine 2 views    MR thoracic spine wo IV contrast    traMADol (Ultram) 50 mg tablet      2. Cervical strain, acute, initial encounter  methylPREDNISolone  sodium succinate (PF) (SOLU-Medrol) injection 125 mg    methylPREDNISolone acetate (DEPO-Medrol) injection 40 mg    ketorolac (Toradol) injection 30 mg    predniSONE (Deltasone) 20 mg tablet    cyclobenzaprine (Flexeril) 10 mg tablet    Referral to Physical Therapy    MR cervical spine wo IV contrast    XR cervical spine complete 4-5 views    XR thoracic spine 2 views    MR thoracic spine wo IV contrast    traMADol (Ultram) 50 mg tablet      3. Strain of left levator scapulae muscle, initial encounter  methylPREDNISolone sodium succinate (PF) (SOLU-Medrol) injection 125 mg    methylPREDNISolone acetate (DEPO-Medrol) injection 40 mg    ketorolac (Toradol) injection 30 mg    predniSONE (Deltasone) 20 mg tablet    cyclobenzaprine (Flexeril) 10 mg tablet    Referral to Physical Therapy    MR cervical spine wo IV contrast    XR cervical spine complete 4-5 views    XR thoracic spine 2 views    MR thoracic spine wo IV contrast    traMADol (Ultram) 50 mg tablet      4. Trapezius strain, left, initial encounter  methylPREDNISolone sodium succinate (PF) (SOLU-Medrol) injection 125 mg    methylPREDNISolone acetate (DEPO-Medrol) injection 40 mg    ketorolac (Toradol) injection 30 mg    predniSONE (Deltasone) 20 mg tablet    cyclobenzaprine (Flexeril) 10 mg tablet    Referral to Physical Therapy    MR cervical spine wo IV contrast    XR cervical spine complete 4-5 views    XR thoracic spine 2 views    MR thoracic spine wo IV contrast    traMADol (Ultram) 50 mg tablet      5. Bulging of cervical intervertebral disc  methylPREDNISolone sodium succinate (PF) (SOLU-Medrol) injection 125 mg    methylPREDNISolone acetate (DEPO-Medrol) injection 40 mg    ketorolac (Toradol) injection 30 mg    predniSONE (Deltasone) 20 mg tablet    cyclobenzaprine (Flexeril) 10 mg tablet    Referral to Physical Therapy    MR cervical spine wo IV contrast    XR cervical spine complete 4-5 views    XR thoracic spine 2 views    MR thoracic spine wo IV  contrast    traMADol (Ultram) 50 mg tablet      6. Left arm weakness  methylPREDNISolone sodium succinate (PF) (SOLU-Medrol) injection 125 mg    methylPREDNISolone acetate (DEPO-Medrol) injection 40 mg    ketorolac (Toradol) injection 30 mg    predniSONE (Deltasone) 20 mg tablet    cyclobenzaprine (Flexeril) 10 mg tablet    Referral to Physical Therapy    MR cervical spine wo IV contrast    XR cervical spine complete 4-5 views    XR thoracic spine 2 views    MR thoracic spine wo IV contrast    traMADol (Ultram) 50 mg tablet      7. Radicular pain in left arm  methylPREDNISolone sodium succinate (PF) (SOLU-Medrol) injection 125 mg    methylPREDNISolone acetate (DEPO-Medrol) injection 40 mg    ketorolac (Toradol) injection 30 mg    predniSONE (Deltasone) 20 mg tablet    cyclobenzaprine (Flexeril) 10 mg tablet    Referral to Physical Therapy    MR cervical spine wo IV contrast    XR cervical spine complete 4-5 views    XR thoracic spine 2 views    MR thoracic spine wo IV contrast    traMADol (Ultram) 50 mg tablet      8. Discogenic thoracic pain  methylPREDNISolone sodium succinate (PF) (SOLU-Medrol) injection 125 mg    methylPREDNISolone acetate (DEPO-Medrol) injection 40 mg    ketorolac (Toradol) injection 30 mg    predniSONE (Deltasone) 20 mg tablet    cyclobenzaprine (Flexeril) 10 mg tablet    Referral to Physical Therapy    MR cervical spine wo IV contrast    XR cervical spine complete 4-5 views    XR thoracic spine 2 views    MR thoracic spine wo IV contrast    traMADol (Ultram) 50 mg tablet      9. Thoracic radiculitis  methylPREDNISolone sodium succinate (PF) (SOLU-Medrol) injection 125 mg    methylPREDNISolone acetate (DEPO-Medrol) injection 40 mg    ketorolac (Toradol) injection 30 mg    predniSONE (Deltasone) 20 mg tablet    cyclobenzaprine (Flexeril) 10 mg tablet    Referral to Physical Therapy    MR cervical spine wo IV contrast    XR cervical spine complete 4-5 views    XR thoracic spine 2 views    MR  thoracic spine wo IV contrast    traMADol (Ultram) 50 mg tablet          Treatment or Intervention:  May continue to alternate ice and moist heat as needed  Reviewed herniated/bulging disc(s) as well as degenerative changes in injury detail with the patient to the level of their understanding at the time of this office visit.   Start into physical therapy 1-2 times a week for 10-12 weeks with manual therapy, dry needling, IASTM, and cervical traction   Reviewed home stretching exercises to be performed by the patient routinely  Stressed the importance of wearing shoes with good stability control to help with the biomechanics affecting the spine  Stressed the importance of wearing full foot insoles to help with the biomechanics affecting the spine and to provide cushioning  Recommendation over-the-counter calcium with vitamin-D 2 -3000+ milligrams a day, as well as OTC symphytum as directed daily to promote bony healing, in addition to a daily multivitamin.  Recommendation to get back on over-the-counter curcumin, turmeric, boswellia, as well as egg shell membrane as directed to aid with joint inflammation.  Recommendation to get back on over-the-counter Move Free for joint health.   May take OTC Tylenol Extra Strength or OTC Tylenol Arthritis, taking one every 6-8 hours with food as needed in between the prednisone as needed for pain  The following prescription was sent to the patients pharmacy of record:  10 DAY TAPER:  The following prescription was electronically sent to the patient's pharmacy of record: Prednisone 20mg, take 4 tablets a day x 4 days, 3 tabletss a day x 3 days, 2 tablets a day x 2 days, and 1 tablet a day x 1 day with food; start tomorrow if received Solu-Medrol injection in the office at visit, otherwise start immediately, dispense quantity sufficient, with no refills   The following prescription was electronically sent to the patient's pharmacy of record: Flexeril (Cyclobenzaprine) 10  milligrams, may take 1 tablet 1-2 hours before bedtime as needed for muscle relaxation, Duration: 30 days, Dispense: #30, Refill: 0  TREATMENT PLAN: Patient received IM injection of SoluMedrol 125 milligrams at the time of this office visit. , Patient received IM injection of Toradol 60 milligrams at the time of this office visit., and Patient received IM injection of DepoMedrol 40 milligrams at the time of this office visit.  Tramadol 50mg 1/2-1 pill every 8 hours only for severe pain PRN x 5 days, dispense 15, no refills I or my designee accessed [place] today to monitor the patients controlled substance history. Reviewed, discussed, and educated patient and/or patients legal guardian about prescribed Opioid/Controlled Substance and had patients and/or patients legal guardian sign Opioid/Controlled Substance Prescription Agreement Consent Form. Additionally, I also discussed in detail with the patient and/or the patients legal guardian to the level of their understanding the risks of addiction and overdose associated with controlled substances. The patient and/or their legal guardian verbalized their understanding at the time of this office visit. Additionally, signed Opioid/Controlled Substance Prescription Staff Checklist Form which was performed by staff at the time of this office visit.   Patient advised regarding the risks and/or potential adverse reactions and/or side effects of any prescribed medications along with any over-the-counter medications or any supplements used. Patient advised to seek immediate medical care if any adverse reactions occur. The patient and/or patient(s) parent(s) verbalized their understanding  Discussed in detail with the patient to the level of their understanding the possibility in the future of a home cervical traction device  Possibly referral to physical medicine rehab or pain management for epidural injections  Possibility regenerative injections in the future  MRI of the  cervical and thoracic spine rule out herniated disc or stress fracture versus other  You have been ordered an MRI of the cervical and thoracic spine. Once you contact scheduling at [phone] and obtain the date and time of your MRI, contact our office at [phone] to schedule your follow-up appointment to review your results. Please note the results of your imaging will not be discussed over the telephone.  Follow-up after MRI cervical and thoracic spine      HARJINDER BARRERA on 7/2/25 at 4:25 PM.     Please note that this report has been produced using speech recognition software.  It may contain errors related to grammar, punctuation or spelling.  Electronically signed, but not reviewed.      Harjinder Barrera D.O. FAOASM         [1] No family history on file.

## 2025-07-09 ENCOUNTER — APPOINTMENT (OUTPATIENT)
Dept: ORTHOPEDIC SURGERY | Facility: CLINIC | Age: 50
End: 2025-07-09
Payer: COMMERCIAL

## 2025-07-14 ENCOUNTER — HOSPITAL ENCOUNTER (OUTPATIENT)
Dept: RADIOLOGY | Facility: CLINIC | Age: 50
Discharge: HOME | End: 2025-07-14
Payer: COMMERCIAL

## 2025-07-14 DIAGNOSIS — S46.812A TRAPEZIUS STRAIN, LEFT, INITIAL ENCOUNTER: ICD-10-CM

## 2025-07-14 DIAGNOSIS — M54.2 CERVICALGIA: ICD-10-CM

## 2025-07-14 DIAGNOSIS — M79.2 RADICULAR PAIN IN LEFT ARM: ICD-10-CM

## 2025-07-14 DIAGNOSIS — R29.898 LEFT ARM WEAKNESS: ICD-10-CM

## 2025-07-14 DIAGNOSIS — S16.1XXA CERVICAL STRAIN, ACUTE, INITIAL ENCOUNTER: ICD-10-CM

## 2025-07-14 DIAGNOSIS — S46.812A STRAIN OF LEFT LEVATOR SCAPULAE MUSCLE, INITIAL ENCOUNTER: ICD-10-CM

## 2025-07-14 DIAGNOSIS — M54.14 THORACIC RADICULITIS: ICD-10-CM

## 2025-07-14 DIAGNOSIS — M51.34 DISCOGENIC THORACIC PAIN: ICD-10-CM

## 2025-07-14 DIAGNOSIS — M50.30 BULGING OF CERVICAL INTERVERTEBRAL DISC: ICD-10-CM

## 2025-07-14 PROCEDURE — 72146 MRI CHEST SPINE W/O DYE: CPT | Performed by: RADIOLOGY

## 2025-07-14 PROCEDURE — 72141 MRI NECK SPINE W/O DYE: CPT

## 2025-07-14 PROCEDURE — 72141 MRI NECK SPINE W/O DYE: CPT | Performed by: RADIOLOGY

## 2025-07-14 PROCEDURE — 72146 MRI CHEST SPINE W/O DYE: CPT
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
## 2025-07-16 ENCOUNTER — OFFICE VISIT (OUTPATIENT)
Dept: ORTHOPEDIC SURGERY | Facility: CLINIC | Age: 50
End: 2025-07-16
Payer: COMMERCIAL

## 2025-07-16 ENCOUNTER — EVALUATION (OUTPATIENT)
Dept: PHYSICAL THERAPY | Facility: CLINIC | Age: 50
End: 2025-07-16
Payer: COMMERCIAL

## 2025-07-16 VITALS
BODY MASS INDEX: 25.77 KG/M2 | SYSTOLIC BLOOD PRESSURE: 143 MMHG | HEART RATE: 79 BPM | WEIGHT: 180 LBS | HEIGHT: 70 IN | DIASTOLIC BLOOD PRESSURE: 99 MMHG

## 2025-07-16 DIAGNOSIS — M47.22 CERVICAL RADICULOPATHY DUE TO DEGENERATIVE JOINT DISEASE OF SPINE: ICD-10-CM

## 2025-07-16 DIAGNOSIS — M48.00 CENTRAL STENOSIS OF SPINAL CANAL: ICD-10-CM

## 2025-07-16 DIAGNOSIS — M51.24 THORACIC DISC HERNIATION: ICD-10-CM

## 2025-07-16 DIAGNOSIS — M48.02 NEURAL FORAMINAL STENOSIS OF CERVICAL SPINE: ICD-10-CM

## 2025-07-16 DIAGNOSIS — M54.14 THORACIC RADICULITIS: ICD-10-CM

## 2025-07-16 DIAGNOSIS — M50.30 DEGENERATION OF CERVICAL INTERVERTEBRAL DISC: ICD-10-CM

## 2025-07-16 DIAGNOSIS — M47.24 THORACIC RADICULOPATHY DUE TO DEGENERATIVE JOINT DISEASE OF SPINE: Primary | ICD-10-CM

## 2025-07-16 DIAGNOSIS — M79.2 RADICULAR PAIN IN LEFT ARM: ICD-10-CM

## 2025-07-16 DIAGNOSIS — M50.30 BULGING OF CERVICAL INTERVERTEBRAL DISC: ICD-10-CM

## 2025-07-16 DIAGNOSIS — M25.78 DISC-OSTEOPHYTE COMPLEX: ICD-10-CM

## 2025-07-16 DIAGNOSIS — M54.2 CERVICALGIA: ICD-10-CM

## 2025-07-16 DIAGNOSIS — M50.30 DDD (DEGENERATIVE DISC DISEASE), CERVICAL: ICD-10-CM

## 2025-07-16 DIAGNOSIS — M51.34 DISCOGENIC THORACIC PAIN: ICD-10-CM

## 2025-07-16 DIAGNOSIS — M50.30 DISC-OSTEOPHYTE COMPLEX: ICD-10-CM

## 2025-07-16 DIAGNOSIS — M47.22 CERVICAL SPONDYLOSIS WITH RADICULOPATHY: Primary | ICD-10-CM

## 2025-07-16 DIAGNOSIS — S16.1XXA CERVICAL STRAIN, ACUTE, INITIAL ENCOUNTER: ICD-10-CM

## 2025-07-16 DIAGNOSIS — S46.812D STRAIN OF LEFT LEVATOR SCAPULAE MUSCLE, SUBSEQUENT ENCOUNTER: ICD-10-CM

## 2025-07-16 DIAGNOSIS — R29.898 LEFT ARM WEAKNESS: ICD-10-CM

## 2025-07-16 DIAGNOSIS — M48.02 CERVICAL SPINAL STENOSIS: ICD-10-CM

## 2025-07-16 PROCEDURE — 1036F TOBACCO NON-USER: CPT | Performed by: FAMILY MEDICINE

## 2025-07-16 PROCEDURE — 99214 OFFICE O/P EST MOD 30 MIN: CPT | Performed by: FAMILY MEDICINE

## 2025-07-16 PROCEDURE — 3008F BODY MASS INDEX DOCD: CPT | Performed by: FAMILY MEDICINE

## 2025-07-16 PROCEDURE — 99213 OFFICE O/P EST LOW 20 MIN: CPT | Performed by: FAMILY MEDICINE

## 2025-07-16 PROCEDURE — 2500000004 HC RX 250 GENERAL PHARMACY W/ HCPCS (ALT 636 FOR OP/ED): Mod: JW | Performed by: FAMILY MEDICINE

## 2025-07-16 PROCEDURE — 96372 THER/PROPH/DIAG INJ SC/IM: CPT | Performed by: FAMILY MEDICINE

## 2025-07-16 PROCEDURE — 97162 PT EVAL MOD COMPLEX 30 MIN: CPT | Mod: GP | Performed by: PHYSICAL THERAPIST

## 2025-07-16 RX ORDER — KETOROLAC TROMETHAMINE 30 MG/ML
30 INJECTION, SOLUTION INTRAMUSCULAR; INTRAVENOUS ONCE
Status: COMPLETED | OUTPATIENT
Start: 2025-07-16 | End: 2025-07-16

## 2025-07-16 RX ORDER — PREGABALIN 50 MG/1
50 CAPSULE ORAL 2 TIMES DAILY
Qty: 60 CAPSULE | Refills: 0 | Status: SHIPPED | OUTPATIENT
Start: 2025-07-16 | End: 2025-08-15

## 2025-07-16 RX ORDER — PREDNISONE 10 MG/1
TABLET ORAL
Qty: 30 TABLET | Refills: 0 | Status: SHIPPED | OUTPATIENT
Start: 2025-07-16

## 2025-07-16 RX ORDER — METHYLPREDNISOLONE SODIUM SUCCINATE 125 MG/2ML
125 INJECTION INTRAMUSCULAR; INTRAVENOUS ONCE
Status: COMPLETED | OUTPATIENT
Start: 2025-07-16 | End: 2025-07-16

## 2025-07-16 RX ORDER — METHYLPREDNISOLONE ACETATE 40 MG/ML
40 INJECTION, SUSPENSION INTRA-ARTICULAR; INTRALESIONAL; INTRAMUSCULAR; SOFT TISSUE ONCE
Status: COMPLETED | OUTPATIENT
Start: 2025-07-16 | End: 2025-07-16

## 2025-07-16 RX ORDER — CYCLOBENZAPRINE HCL 10 MG
10 TABLET ORAL NIGHTLY PRN
Qty: 20 TABLET | Refills: 0 | Status: SHIPPED | OUTPATIENT
Start: 2025-07-16 | End: 2025-08-05

## 2025-07-16 RX ADMIN — KETOROLAC TROMETHAMINE 30 MG: 60 INJECTION, SOLUTION INTRAMUSCULAR at 08:30

## 2025-07-16 RX ADMIN — METHYLPREDNISOLONE SODIUM SUCCINATE 125 MG: 125 INJECTION, POWDER, FOR SOLUTION INTRAMUSCULAR; INTRAVENOUS at 08:31

## 2025-07-16 RX ADMIN — METHYLPREDNISOLONE ACETATE 40 MG: 40 INJECTION, SUSPENSION INTRA-ARTICULAR; INTRALESIONAL; INTRAMUSCULAR; SOFT TISSUE at 08:32

## 2025-07-16 ASSESSMENT — COLUMBIA-SUICIDE SEVERITY RATING SCALE - C-SSRS
1. IN THE PAST MONTH, HAVE YOU WISHED YOU WERE DEAD OR WISHED YOU COULD GO TO SLEEP AND NOT WAKE UP?: NO
2. HAVE YOU ACTUALLY HAD ANY THOUGHTS OF KILLING YOURSELF?: NO
6. HAVE YOU EVER DONE ANYTHING, STARTED TO DO ANYTHING, OR PREPARED TO DO ANYTHING TO END YOUR LIFE?: NO

## 2025-07-16 ASSESSMENT — PATIENT HEALTH QUESTIONNAIRE - PHQ9
SUM OF ALL RESPONSES TO PHQ9 QUESTIONS 1 AND 2: 0
2. FEELING DOWN, DEPRESSED OR HOPELESS: NOT AT ALL
1. LITTLE INTEREST OR PLEASURE IN DOING THINGS: NOT AT ALL

## 2025-07-16 ASSESSMENT — PAIN - FUNCTIONAL ASSESSMENT: PAIN_FUNCTIONAL_ASSESSMENT: 0-10

## 2025-07-16 ASSESSMENT — PAIN SCALES - GENERAL
PAINLEVEL_OUTOF10: 5 - MODERATE PAIN
PAINLEVEL_OUTOF10: 5

## 2025-07-16 ASSESSMENT — ENCOUNTER SYMPTOMS
OCCASIONAL FEELINGS OF UNSTEADINESS: 0
LOSS OF SENSATION IN FEET: 0
DEPRESSION: 0

## 2025-07-16 ASSESSMENT — PAIN DESCRIPTION - DESCRIPTORS: DESCRIPTORS: ACHING;SORE;RADIATING

## 2025-07-16 NOTE — PROGRESS NOTES
Physical Therapy    Physical Therapy Evaluation and Treatment    Patient Name: Pipe Izaguirre  MRN: [id number]  Encounter Date: 2025     Time Calculation  Start Time: 1234  Stop Time: 1320  Time Calculation (min): 46 min    Insurance: 2025: NO AUTH, $1600 DED, 100% COVERAGE, 40V PT, MMO TRANS# [id number]    Current Problem:   1. Cervical spondylosis with radiculopathy  Follow Up In Physical Therapy      2. Degeneration of cervical intervertebral disc  Follow Up In Physical Therapy        Relevant Past Medical History: Left Elbow Fracture in   Surgical History: Left Elbow , Right Hand   Medications: See chart  Allergies: No Known Allergies    Precautions:   STEADI Fall Risk: 0 (score of 4+ indicates fall risk)     SUBJECTIVE:   The patient is a 49 year old male presenting with neck pain and shoulder pain for the last 5 weeks.  He began having left shoulder blade pain and went for a massage.  He noted increased pain after the massage and began having radicular symptoms.    The pt reports that he has been dealing with a lot of stress recently.    Imagin25 MRI  IMPRESSION:  1. Diffuse degenerative changes throughout the cervical spine with  mild spinal canal stenosis at C3-C4, C5-C6 and C6-C7.  2. Severe foraminal stenosis at the following levels: Left C2-C3,  bilateral C3-C4, right C5-C6, and bilateral T1-T2.  3. Moderate foraminal stenosis at the following levels: Bilateral  C4-C5 and right C6-C7.  4. Disc herniations throughout the thoracic spine with mild cord  flattening at T6-T7 and T7-T8 without significant spinal canal  stenosis.     Pain:   Current: 4/10  Lowest: 2/10  Highest: 8/10  Location: Neck and Left UE  Description: Ache, Shooting, Burning  Aggravating Factors: Certain movements  Relieving Factors: Rest  Sleep Pattern: Sleep disturbance  Previous Interventions: Chiropractic, massage     Red flags:   Red flags   Hx of CA No   Pacemaker/ Electronic Implant No   Saddle  Anesthesia No   Bowel/Bladder Changes No   Sudden Weakness No   Recent Falls (within last 6mo) No       Occupation: Sales  Hobbies: Likes to stay active  Current Level of Function: IND  Prior Level of Function: IND    Patient/Family Goal: Relieve pain    Outcome Measures:   Other Measures  Neck Disability Index: 16    OBJECTIVE:    Cognition: Alert and oriented  Posture: Rounded shoulders, forward head, cervical lordosis  Functional Mobility: IND  Palpation: Tender bilateral cervical paraspinals  AROM  Cervical Flexion  50  Extension 70  Rotation R 75  L 70    Strength   II  Right  95, 95  Left 60, 65    Pinch Strength  Right  17, 16   Left 8, 8    HHD  Shoulder FL  R  41.4   L 40.3    Treatments:  Therapeutic Exercise: (5 minutes)  - Chin Tucks  - UT Stretch to tolerance  - Scapular Retraction    Manual Therapy: (5 minutes)  - Cervical Traction    Mechanical Traction 15# x 15minutes, Static    OP Education: Goals, HEP, POC     HEP:  Chin tuck, Scapular Retraction    Response to treatment: No pain increase.    ASSESSMENT:   The pt is a 49 year old male presenting with neck pain and with left UE neuro symptoms consistent with disc involvement.  Pt would benefit from additional PT to address ROM, strength, pain, posture, and function.    Complexity of Evaluation:   Based on the history including personal factors and/or comorbidities, examination of body systems including body structures and function, activity limitations, and/or participation restrictions, as well as clinical presentation, patient meets criteria for a moderate complexity evaluation.     PLAN:  OP PT PLAN:  Treatment/Interventions: Aquatic Therapy , Cryotherapy , Dry Needling  , Education/Instruction , Electrical Stimulation , Gait training , Hot Pack , Manual Therapy  , Mechanical Traction  , Neuromuscular re-education , Self care/home management , Taping techniques , Therapeutic activities , Therapeutic exercise  , and Ultrasound   PT Plan:  Skilled PT   PT Frequency: 1-2 times per week  Duration:10 visits  Insurance: 07/16/2025: NO AUTH, $1600 DED, 100% COVERAGE, 40V PT, MMO TRANS# [id number]  Rehab Potential: Good  Plan of Care Agreement: Patient       Goals:   5 visits (STG)  Pt will be independent with home exercise program with handouts.   2.   Pt will be independent with correction of posture.   3.   Increase Cervical AROM by 10 degrees in order to progress with improving functional mobility.   4.   The pt will be able to complete chin tuck without pain increase.     10 visits (LTG)  Patient to rotate neck to Right / Left in order to look over shoulder when driving to safely switch lanes and back up a care with no pain.   2.   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   3.   Patient to demonstrate the use of proper body mechanics and posture when performing their job to decrease symptoms.   4.   Patent to demonstrate proper body mechanics and posture while sleeping to reduce symptoms.     5.    Improve left  strength to right.  6.    Improve Pinch strength on left to equal right.  7.    Return to previous exercise routine.

## 2025-07-16 NOTE — PATIENT INSTRUCTIONS
May continue to alternate ice and moist heat as needed  Reviewed herniated/bulging disc(s) as well as degenerative changes in injury detail with the patient to the level of their understanding at the time of this office visit.   Start into physical therapy 1-2 times a week for 10-12 weeks with manual therapy, dry needling, IASTM, and cervical traction   Reviewed home stretching exercises to be performed by the patient routinely  Stressed the importance of wearing shoes with good stability control to help with the biomechanics affecting the spine  Stressed the importance of wearing full foot insoles to help with the biomechanics affecting the spine and to provide cushioning  Recommendation over-the-counter calcium with vitamin-D 2 -3000+ milligrams a day, as well as OTC symphytum as directed daily to promote bony healing, in addition to a daily multivitamin.  Recommendation to get back on over-the-counter curcumin, turmeric, boswellia, as well as egg shell membrane as directed to aid with joint inflammation.  Recommendation to get back on over-the-counter Move Free for joint health.   May take OTC Tylenol Extra Strength or OTC Tylenol Arthritis, taking one every 6-8 hours with food as needed in between the prednisone as needed for pain   TREATMENT PLAN: Patient received IM injection of SoluMedrol 125 milligrams at the time of this office visit. , Patient received IM injection of Toradol 60 milligrams at the time of this office visit., and Patient received IM injection of DepoMedrol 40 milligrams at the time of this office visit.  15 DAY TAPER 3FOR5: Prednisone 10 milligrams take the following with food: 3 tablets daily for 5 days, then 2 tablets daily for 5 days, then 1 tablet daily for 5 days dispense #30 no refills   The following prescription was electronically sent to the patient's pharmacy of record: Flexeril (Cyclobenzaprine) 10 milligrams, may take 1 tablet 1-2 hours before bedtime as needed for muscle  relaxation, Duration: 30 days, Dispense: #30, Refill: 0  Patient advised regarding the risks and/or potential adverse reactions and/or side effects of any prescribed medications along with any over-the-counter medications or any supplements used. Patient advised to seek immediate medical care if any adverse reactions occur. The patient and/or patient(s) parent(s) verbalized their understanding  Prescribed Lyrica 50mg, Duration 30 days, Dispense #60, Refill: 0. Take 1 pill by mouth for 7 days, then take 2 pills by mouth until finished.  Discussed in detail with the patient to the level of their understanding the possibility in the future of a home cervical traction device  Possibly referral to physical medicine rehab or pain management for epidural injections  Possibility regenerative injections in the future  Reviewed CERVICAL and THORACIC MRI in detail with the patient and/or patients parent/legal guardian to their level of understanding; a copy of these results were provided to the patient and/or patients parent/legal guardian at the time of this office visit.  Referral to Dr. [facility] for possible epidural injections.

## 2025-07-17 DIAGNOSIS — M48.00 CENTRAL STENOSIS OF SPINAL CANAL: ICD-10-CM

## 2025-07-17 DIAGNOSIS — M54.2 CERVICALGIA: ICD-10-CM

## 2025-07-17 DIAGNOSIS — M48.02 NEURAL FORAMINAL STENOSIS OF CERVICAL SPINE: ICD-10-CM

## 2025-07-17 DIAGNOSIS — M47.22 CERVICAL RADICULOPATHY DUE TO DEGENERATIVE JOINT DISEASE OF SPINE: ICD-10-CM

## 2025-07-17 DIAGNOSIS — M50.30 DDD (DEGENERATIVE DISC DISEASE), CERVICAL: ICD-10-CM

## 2025-07-17 DIAGNOSIS — M50.30 BULGING OF CERVICAL INTERVERTEBRAL DISC: ICD-10-CM

## 2025-07-24 ENCOUNTER — TREATMENT (OUTPATIENT)
Dept: PHYSICAL THERAPY | Facility: CLINIC | Age: 50
End: 2025-07-24
Payer: COMMERCIAL

## 2025-07-24 DIAGNOSIS — M47.22 CERVICAL SPONDYLOSIS WITH RADICULOPATHY: ICD-10-CM

## 2025-07-24 DIAGNOSIS — M50.30 DEGENERATION OF CERVICAL INTERVERTEBRAL DISC: ICD-10-CM

## 2025-07-24 PROCEDURE — 97012 MECHANICAL TRACTION THERAPY: CPT | Mod: GP | Performed by: PHYSICAL THERAPIST

## 2025-07-24 NOTE — PROGRESS NOTES
Physical Therapy    Physical Therapy Treatment    Patient Name: Pipe Izaguirre  MRN: [id number]  Encounter Date: 7/24/2025     Time Calculation  Start Time: 1500  Stop Time: 1540  Time Calculation (min): 40 min    Visit #: 2  out of 10  Insurance: 07/16/2025: NO AUTH, $1600 DED, 100% COVERAGE, 40V PT, MMO TRANS# [id number]   Evaluation date: 7/16/25    Current Problem:   1. Cervical spondylosis with radiculopathy  Follow Up In Physical Therapy      2. Degeneration of cervical intervertebral disc  Follow Up In Physical Therapy        SUBJECTIVE:   The pt states that he felt good after the initial PT session.  Has been using red light therapy with good results.  Stopped taking Lyrica due to side effects.     Precautions: STEADI Fall Risk: 0 (score of 4+ indicates fall risk)      Pain:   Start of session: 3/10     OBJECTIVE:    Good ability to complete chin tuck without pain increase.    Treatments:  Therapeutic Exercise: (5 minutes)   Review of Home Program      Modalities:    Mechanical Traction 15# x 15minutes, Static     Mechanical Traction 18# x 15 minutes, Static     HEP:  Chin tuck, Scapular Retraction     ASSESSMENT:   The pt responds favorably to mechanical traction.  Will continue with this passive modality and increase active mobility and strengthening as symptoms allow.    Post session pain: No significant changes.     PLAN:  OP PT PLAN:  Treatment/Interventions: Aquatic Therapy , Cryotherapy , Dry Needling  , Education/Instruction , Electrical Stimulation , Gait training , Hot Pack , Manual Therapy  , Mechanical Traction  , Neuromuscular re-education , Self care/home management , Taping techniques , Therapeutic activities , Therapeutic exercise  , and Ultrasound   PT Plan: Skilled PT   PT Frequency: 1-2 times per week  Duration:10 visits  Insurance: 07/16/2025: NO AUTH, $1600 DED, 100% COVERAGE, 40V PT, MMO TRANS# [id number]  Rehab Potential: Good  Plan of Care Agreement: Patient         Goals:   5 visits  (STG)  Pt will be independent with home exercise program with handouts.   2.   Pt will be independent with correction of posture.   3.   Increase Cervical AROM by 10 degrees in order to progress with improving functional mobility.   4.   The pt will be able to complete chin tuck without pain increase.      10 visits (LTG)  Patient to rotate neck to Right / Left in order to look over shoulder when driving to safely switch lanes and back up a care with no pain.   2.   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   3.   Patient to demonstrate the use of proper body mechanics and posture when performing their job to decrease symptoms.   4.   Patent to demonstrate proper body mechanics and posture while sleeping to reduce symptoms.     5.    Improve left  strength to right.  6.    Improve Pinch strength on left to equal right.  7.    Return to previous exercise routine.

## 2025-07-28 ENCOUNTER — TREATMENT (OUTPATIENT)
Dept: PHYSICAL THERAPY | Facility: CLINIC | Age: 50
End: 2025-07-28
Payer: COMMERCIAL

## 2025-07-28 DIAGNOSIS — M50.30 DEGENERATION OF CERVICAL INTERVERTEBRAL DISC: ICD-10-CM

## 2025-07-28 DIAGNOSIS — M47.22 CERVICAL SPONDYLOSIS WITH RADICULOPATHY: ICD-10-CM

## 2025-07-28 PROCEDURE — 97012 MECHANICAL TRACTION THERAPY: CPT | Mod: GP | Performed by: PHYSICAL THERAPIST

## 2025-07-29 NOTE — PROGRESS NOTES
Physical Therapy    Physical Therapy Treatment    Patient Name: Pipe Izaguirre  MRN: [id number]  Encounter Date: 7/28/2025     Time Calculation  Start Time: 1705  Stop Time: 1740  Time Calculation (min): 35 min    Visit #: 3  out of 10  Insurance: 07/16/2025: NO AUTH, $1600 DED, 100% COVERAGE, 40V PT, MMO TRANS# [id number]   Evaluation date: 7/16/25    Current Problem:   1. Cervical spondylosis with radiculopathy  Follow Up In Physical Therapy      2. Degeneration of cervical intervertebral disc  Follow Up In Physical Therapy        SUBJECTIVE:   The pt was sore the day after last session.  No significant changes recently.     Precautions: STEADI Fall Risk: 0 (score of 4+ indicates fall risk)      Pain:   Start of session: 3/10     OBJECTIVE:    Good ability to complete chin tuck without pain increase.    Treatments:  Therapeutic Exercise: (5 minutes)   Review of Home Program      Modalities:      Mechanical Traction 20# fo 30 minutes, Static     HEP:  Chin tuck, Scapular Retraction     ASSESSMENT:   The pt responds favorably to mechanical traction.  Will continue with this passive modality and increase active mobility and strengthening as symptoms allow.    Post session pain: No significant changes.     PLAN:  OP PT PLAN:  Treatment/Interventions: Aquatic Therapy , Cryotherapy , Dry Needling  , Education/Instruction , Electrical Stimulation , Gait training , Hot Pack , Manual Therapy  , Mechanical Traction  , Neuromuscular re-education , Self care/home management , Taping techniques , Therapeutic activities , Therapeutic exercise  , and Ultrasound   PT Plan: Skilled PT   PT Frequency: 1-2 times per week  Duration:10 visits  Insurance: 07/16/2025: NO AUTH, $1600 DED, 100% COVERAGE, 40V PT, MMO TRANS# [id number]  Rehab Potential: Good  Plan of Care Agreement: Patient         Goals:   5 visits (STG)  Pt will be independent with home exercise program with handouts.   2.   Pt will be independent with correction of  posture.   3.   Increase Cervical AROM by 10 degrees in order to progress with improving functional mobility.   4.   The pt will be able to complete chin tuck without pain increase.      10 visits (LTG)  Patient to rotate neck to Right / Left in order to look over shoulder when driving to safely switch lanes and back up a care with no pain.   2.   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   3.   Patient to demonstrate the use of proper body mechanics and posture when performing their job to decrease symptoms.   4.   Patent to demonstrate proper body mechanics and posture while sleeping to reduce symptoms.     5.    Improve left  strength to right.  6.    Improve Pinch strength on left to equal right.  7.    Return to previous exercise routine.

## 2025-07-31 ENCOUNTER — TREATMENT (OUTPATIENT)
Dept: PHYSICAL THERAPY | Facility: CLINIC | Age: 50
End: 2025-07-31
Payer: COMMERCIAL

## 2025-07-31 DIAGNOSIS — M47.22 CERVICAL SPONDYLOSIS WITH RADICULOPATHY: ICD-10-CM

## 2025-07-31 DIAGNOSIS — M50.30 DEGENERATION OF CERVICAL INTERVERTEBRAL DISC: ICD-10-CM

## 2025-07-31 PROCEDURE — 97012 MECHANICAL TRACTION THERAPY: CPT | Mod: GP | Performed by: PHYSICAL THERAPIST

## 2025-07-31 NOTE — PROGRESS NOTES
Physical Therapy    Physical Therapy Treatment    Patient Name: Pipe Izaguirre  MRN: [id number]  Encounter Date: 7/31/2025     Time Calculation  Start Time: 1544  Stop Time: 1631  Time Calculation (min): 47 min    Visit #: 4  out of 10  Insurance: 07/16/2025: NO AUTH, $1600 DED, 100% COVERAGE, 40V PT, MMO TRANS# [id number]   Evaluation date: 7/16/25    Current Problem:   1. Cervical spondylosis with radiculopathy  Follow Up In Physical Therapy      2. Degeneration of cervical intervertebral disc  Follow Up In Physical Therapy        SUBJECTIVE:   The pt felt good for two days after last session.     Precautions: STEADI Fall Risk: 0 (score of 4+ indicates fall risk)      Pain:   Start of session: 3/10     OBJECTIVE:    Good ability to complete chin tuck without pain increase.    Treatments:  Therapeutic Exercise: (5 minutes)   Review of Home Program      Modalities:  The patient was instructed with the benefits and risks of dry needling.  Verbal consent obtained.  Patient was prepped with 70% Isopropyl alcohol prep pads to the site(s).   Sterile, single use, solid filament needles were inserted at various depths and angles to release tight tissue, improve microcirculation and remove neuro-noxious chemicals via a myofascial twitch response.  DN to left levator with 30mm needles x 10.  [place] Precautions maintained.  No adverse effects noted.     Mechanical Traction 20# fo 25 minutes, Static     HEP:  Chin tuck, Scapular Retraction     ASSESSMENT:   The pt responds favorably to mechanical traction and DN  Will continue with this passive modality and increase active mobility and strengthening as symptoms allow.    Post session pain: No significant changes.     PLAN:  OP PT PLAN:  Treatment/Interventions: Aquatic Therapy , Cryotherapy , Dry Needling  , Education/Instruction , Electrical Stimulation , Gait training , Hot Pack , Manual Therapy  , Mechanical Traction  , Neuromuscular re-education , Self care/home  management , Taping techniques , Therapeutic activities , Therapeutic exercise  , and Ultrasound   PT Plan: Skilled PT   PT Frequency: 1-2 times per week  Duration:10 visits  Insurance: 07/16/2025: NO AUTH, $1600 DED, 100% COVERAGE, 40V PT, MMO TRANS# [id number]  Rehab Potential: Good  Plan of Care Agreement: Patient       Goals:   5 visits (STG)  Pt will be independent with home exercise program with handouts.   2.   Pt will be independent with correction of posture.   3.   Increase Cervical AROM by 10 degrees in order to progress with improving functional mobility.   4.   The pt will be able to complete chin tuck without pain increase.      10 visits (LTG)  Patient to rotate neck to Right / Left in order to look over shoulder when driving to safely switch lanes and back up a care with no pain.   2.   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   3.   Patient to demonstrate the use of proper body mechanics and posture when performing their job to decrease symptoms.   4.   Patent to demonstrate proper body mechanics and posture while sleeping to reduce symptoms.     5.    Improve left  strength to right.  6.    Improve Pinch strength on left to equal right.  7.    Return to previous exercise routine.

## 2025-08-06 ENCOUNTER — TREATMENT (OUTPATIENT)
Dept: PHYSICAL THERAPY | Facility: CLINIC | Age: 50
End: 2025-08-06
Payer: COMMERCIAL

## 2025-08-06 DIAGNOSIS — M50.30 DEGENERATION OF CERVICAL INTERVERTEBRAL DISC: ICD-10-CM

## 2025-08-06 DIAGNOSIS — M47.22 CERVICAL SPONDYLOSIS WITH RADICULOPATHY: ICD-10-CM

## 2025-08-06 PROCEDURE — 97012 MECHANICAL TRACTION THERAPY: CPT | Mod: GP | Performed by: PHYSICAL THERAPIST

## 2025-08-06 NOTE — PATIENT INSTRUCTIONS
Access Code: [email or web address]  URL: [email or web address]  Date: 08/06/2025  Prepared by:     Exercises  - Standing Shoulder Row with Anchored Resistance  - 2 x daily - 7 x weekly - 2 sets - 10 reps - 1 hold  - Standing Deep Neck Flexor Training with Anchored Resistance  - 2 x daily - 7 x weekly - 2 sets - 10 reps - 1 hold

## 2025-08-06 NOTE — PROGRESS NOTES
Physical Therapy Treatment    Patient Name: Pipe Izaguirre  MRN: [id number]  YOB: 1975  Encounter Date: 8/6/2025    Time Entry:  Time Calculation  Start Time: 1520  Stop Time: 1618  Time Calculation (min): 58 min        PT Modalities Time Entry  Mechanical Traction Time Entry: 30                Rehab Insurance Information:   Visit Count: 5  POC Visits: 10  Auth Required: No        Additional Authorization/Insurance Information: 07/16/2025: NO AUTH, $1600 DED, 100% COVERAGE, 40V PT, MMO TRANS# [id number]    Rehab Falls Risk Assessment:  Fall Risk Indicated: No      Problem List Items Addressed This Visit    None  Visit Diagnoses         Codes      Cervical spondylosis with radiculopathy     M47.22      Degeneration of cervical intervertebral disc     M50.30                 Subjective   Had some left UE nerve pain in the evening after last session.  Subsided within hours..  Pain reported as 2.           Objective            Right  Strength  Right Hand Dynamometer Position: 2  Elbow Position Forearm Position Trial 1 (lbs) Trial 2  (lbs) Trial 3  (lbs) Average  (lbs) Pain   Flexed Neutral 110               Left  Strength  Left Hand Dynamometer Position: 2  Elbow Position Forearm Position Trial 1 (lbs) Trial 2 (lbs) Trial 3 (lbs) Average (lbs) Pain   Flexed Neutral 80               Right Pinch Strength   Trial 1 (lbs) Trial 2 (lbs) Trial 3 (lbs) Average (lbs) Pain   Lateral (Key Pinch)             Three Point (Three Jaw Cem) 17           Two Point (Tip to Tip)                 Left Pinch Strength   Trial 1 (lbs) Trial 2 (lbs) Trial 3 (lbs) Average (lbs) Pain   Lateral (Key Pinch)             Three Point (Three Jaw Cem) 10           Two Point (Tip to Tip)                           Activities   Therapeutic Exercise  Therapeutic Exercise Performed: Yes  Therapeutic Exercise Activity 1: Standing row for scapular retraction with level 5 band 3 x 10  Therapeutic Exercise Activity 2: Standing Cervical  Isometric with level 1 band               Manual Therapy  Manual Therapy Performed: Yes   Modalities  Modalities Used: Yes  Modality 1: Untimed Mechanical Traction (20 pounds, static, 30 minutes)                            The patient was instructed with the benefits and risks of dry needling.  Verbal consent obtained.  Patient was prepped with 70% Isopropyl alcohol prep pads to the site(s).   Sterile, single use, solid filament needles were inserted at various depths and angles to release tight tissue, improve microcirculation and remove neuro-noxious chemicals via a myofascial twitch response.  DN to left levator with .30 needles x 10.  [place] Precautions maintained.  No adverse effects noted.             Assessment/Plan   Assessment: Pt did well with more activity today with band resistance.   Notes less pain with traction and dry needling.  Improved  strength versus initial session.  Pain level minimal after session. Patient tolerated treatment well    Active       Short Term Goals (Progressing)       Start:  08/06/25    Expected End:  08/22/25       5 visits (STG)  1. Pt will be independent with home exercise program with handouts.   2.   Pt will be independent with correction of posture.   3.   Increase Cervical AROM by 10 degrees in order to progress with improving functional mobility.   4.   The pt will be able to complete chin tuck without pain increase.            Long Term Goals (Progressing)       Start:  08/06/25    Expected End:  09/20/25       10 visits (LTG)  1. Patient to rotate neck to Right / Left in order to look over shoulder when driving to safely switch lanes and back up a care with no pain.   2.   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   3.   Patient to demonstrate the use of proper body mechanics and posture when performing their job to decrease symptoms.   4.   Patent to demonstrate proper body mechanics and  posture while sleeping to reduce symptoms.     5.    Improve left  strength to right.  6.    Improve Pinch strength on left to equal right.  7.    Return to previous exercise routine.          Plan: Will add therapeutic exercise as patient tolerates.

## 2025-08-07 ENCOUNTER — TREATMENT (OUTPATIENT)
Dept: PHYSICAL THERAPY | Facility: CLINIC | Age: 50
End: 2025-08-07
Payer: COMMERCIAL

## 2025-08-07 DIAGNOSIS — M50.30 DEGENERATION OF CERVICAL INTERVERTEBRAL DISC: ICD-10-CM

## 2025-08-07 DIAGNOSIS — M47.22 CERVICAL SPONDYLOSIS WITH RADICULOPATHY: Primary | ICD-10-CM

## 2025-08-07 PROCEDURE — 97012 MECHANICAL TRACTION THERAPY: CPT | Mod: GP | Performed by: PHYSICAL THERAPIST

## 2025-08-07 NOTE — PROGRESS NOTES
Physical Therapy Treatment    Patient Name: Pipe Izaguirre  MRN: [id number]  YOB: 1975  Encounter Date: 8/7/2025    Time Entry:  Time Calculation  Start Time: 1558  Stop Time: 1649  Time Calculation (min): 51 min        PT Modalities Time Entry  Mechanical Traction Time Entry: 30                Rehab Insurance Information:   Visit Count: 6  POC Visits: 10  Auth Required: No             Rehab Falls Risk Assessment:  Fall Risk Indicated: No      Problem List Items Addressed This Visit    None  Visit Diagnoses         Codes      Cervical spondylosis with radiculopathy    -  Primary M47.22      Degeneration of cervical intervertebral disc     M50.30                 Subjective   The patient noted feeling better after last session, which was yesterday.  Some soreness reported, but nothing significant..  Pain reported as 1.           Objective         Subcranial Range of Motion   Active Restricted? Passive Restricted? Pain   Flexion         Protraction         Retraction No         WFL: Flexion, Extension, Right Lateral Flexion, Left Lateral Flexion, Right Rotation, and Left Rotation                 Activities                   Manual Therapy  Manual Therapy Performed: Yes  Manual Therapy Activity 1: The patient was instructed with the benefits and risks of dry needling.  Verbal consent obtained.  Patient was prepped with 70% Isopropyl alcohol prep pads to the site(s).   Sterile, single use, solid filament needles were inserted at various depths and angles to release tight tissue, improve microcirculation and remove neuro-noxious chemicals via a myofascial twitch response.  DN to left levator with .30 needles x 10.  [place] Precautions maintained.  No adverse effects noted.   Modalities  Modalities Used: Yes  Modality 1: Untimed Mechanical Traction (20 pounds, 30 minutes)                                        Assessment/Plan   Assessment: Pt did well again today with mechanical traction. Patient tolerated  treatment well    Active       Short Term Goals (Progressing)       Start:  08/06/25    Expected End:  08/22/25       5 visits (STG)  1. Pt will be independent with home exercise program with handouts.   2.   Pt will be independent with correction of posture.   3.   Increase Cervical AROM by 10 degrees in order to progress with improving functional mobility.   4.   The pt will be able to complete chin tuck without pain increase.           Plan: Will continue with current plan of care.  Add activities as patient can tolerate.

## 2025-08-13 ENCOUNTER — TREATMENT (OUTPATIENT)
Dept: PHYSICAL THERAPY | Facility: CLINIC | Age: 50
End: 2025-08-13
Payer: COMMERCIAL

## 2025-08-13 DIAGNOSIS — M50.30 DEGENERATION OF CERVICAL INTERVERTEBRAL DISC: ICD-10-CM

## 2025-08-13 DIAGNOSIS — M47.22 CERVICAL SPONDYLOSIS WITH RADICULOPATHY: ICD-10-CM

## 2025-08-13 PROCEDURE — 97012 MECHANICAL TRACTION THERAPY: CPT | Mod: GP | Performed by: PHYSICAL THERAPIST

## 2025-08-13 ASSESSMENT — PROMIS GLOBAL HEALTH SCALE
CARRYOUT_SOCIAL_ACTIVITIES: GOOD
RATE_GENERAL_HEALTH: GOOD
RATE_SOCIAL_SATISFACTION: GOOD
RATE_MENTAL_HEALTH: GOOD
CARRYOUT_PHYSICAL_ACTIVITIES: COMPLETELY
RATE_AVERAGE_PAIN: 1
RATE_PHYSICAL_HEALTH: GOOD
RATE_QUALITY_OF_LIFE: GOOD
EMOTIONAL_PROBLEMS: NEVER

## 2025-08-14 ENCOUNTER — APPOINTMENT (OUTPATIENT)
Dept: PRIMARY CARE | Facility: CLINIC | Age: 50
End: 2025-08-14
Payer: COMMERCIAL

## 2025-08-18 ENCOUNTER — TREATMENT (OUTPATIENT)
Dept: PHYSICAL THERAPY | Facility: CLINIC | Age: 50
End: 2025-08-18
Payer: COMMERCIAL

## 2025-08-18 DIAGNOSIS — M47.22 CERVICAL SPONDYLOSIS WITH RADICULOPATHY: ICD-10-CM

## 2025-08-18 DIAGNOSIS — M50.30 DEGENERATION OF CERVICAL INTERVERTEBRAL DISC: ICD-10-CM

## 2025-08-18 PROCEDURE — 97012 MECHANICAL TRACTION THERAPY: CPT | Mod: GP | Performed by: PHYSICAL THERAPIST

## 2025-08-22 ENCOUNTER — OFFICE VISIT (OUTPATIENT)
Dept: ORTHOPEDIC SURGERY | Facility: CLINIC | Age: 50
End: 2025-08-22
Payer: COMMERCIAL

## 2025-08-22 VITALS
DIASTOLIC BLOOD PRESSURE: 75 MMHG | HEART RATE: 62 BPM | BODY MASS INDEX: 25.77 KG/M2 | SYSTOLIC BLOOD PRESSURE: 120 MMHG | WEIGHT: 180 LBS | HEIGHT: 70 IN

## 2025-08-22 DIAGNOSIS — M47.22 CERVICAL RADICULOPATHY DUE TO DEGENERATIVE JOINT DISEASE OF SPINE: ICD-10-CM

## 2025-08-22 DIAGNOSIS — M54.14 THORACIC RADICULITIS: ICD-10-CM

## 2025-08-22 DIAGNOSIS — M48.00 CENTRAL STENOSIS OF SPINAL CANAL: ICD-10-CM

## 2025-08-22 DIAGNOSIS — M54.2 CERVICALGIA: ICD-10-CM

## 2025-08-22 DIAGNOSIS — M51.24 THORACIC DISC HERNIATION: ICD-10-CM

## 2025-08-22 DIAGNOSIS — M48.02 NEURAL FORAMINAL STENOSIS OF CERVICAL SPINE: ICD-10-CM

## 2025-08-22 DIAGNOSIS — S16.1XXA CERVICAL STRAIN, ACUTE, INITIAL ENCOUNTER: ICD-10-CM

## 2025-08-22 DIAGNOSIS — M47.24 THORACIC RADICULOPATHY DUE TO DEGENERATIVE JOINT DISEASE OF SPINE: ICD-10-CM

## 2025-08-22 DIAGNOSIS — M51.34 DISCOGENIC THORACIC PAIN: ICD-10-CM

## 2025-08-22 DIAGNOSIS — M48.02 CERVICAL SPINAL STENOSIS: ICD-10-CM

## 2025-08-22 DIAGNOSIS — R29.898 LEFT ARM WEAKNESS: ICD-10-CM

## 2025-08-22 PROCEDURE — 99213 OFFICE O/P EST LOW 20 MIN: CPT | Performed by: FAMILY MEDICINE

## 2025-08-22 PROCEDURE — 1036F TOBACCO NON-USER: CPT | Performed by: FAMILY MEDICINE

## 2025-08-22 PROCEDURE — 3008F BODY MASS INDEX DOCD: CPT | Performed by: FAMILY MEDICINE

## 2025-08-22 PROCEDURE — 99212 OFFICE O/P EST SF 10 MIN: CPT | Performed by: FAMILY MEDICINE

## 2025-08-22 ASSESSMENT — COLUMBIA-SUICIDE SEVERITY RATING SCALE - C-SSRS
6. HAVE YOU EVER DONE ANYTHING, STARTED TO DO ANYTHING, OR PREPARED TO DO ANYTHING TO END YOUR LIFE?: NO
1. IN THE PAST MONTH, HAVE YOU WISHED YOU WERE DEAD OR WISHED YOU COULD GO TO SLEEP AND NOT WAKE UP?: NO
2. HAVE YOU ACTUALLY HAD ANY THOUGHTS OF KILLING YOURSELF?: NO

## 2025-08-22 ASSESSMENT — PAIN SCALES - GENERAL: PAINLEVEL_OUTOF10: 0-NO PAIN

## 2025-08-22 ASSESSMENT — PAIN - FUNCTIONAL ASSESSMENT
PAIN_FUNCTIONAL_ASSESSMENT: NO/DENIES PAIN
PAIN_FUNCTIONAL_ASSESSMENT: NO/DENIES PAIN

## 2025-08-22 ASSESSMENT — ENCOUNTER SYMPTOMS
DEPRESSION: 0
LOSS OF SENSATION IN FEET: 0
OCCASIONAL FEELINGS OF UNSTEADINESS: 0

## 2025-08-22 ASSESSMENT — PATIENT HEALTH QUESTIONNAIRE - PHQ9
2. FEELING DOWN, DEPRESSED OR HOPELESS: NOT AT ALL
1. LITTLE INTEREST OR PLEASURE IN DOING THINGS: NOT AT ALL
SUM OF ALL RESPONSES TO PHQ9 QUESTIONS 1 AND 2: 0